# Patient Record
Sex: FEMALE | Race: BLACK OR AFRICAN AMERICAN | NOT HISPANIC OR LATINO | Employment: OTHER | URBAN - METROPOLITAN AREA
[De-identification: names, ages, dates, MRNs, and addresses within clinical notes are randomized per-mention and may not be internally consistent; named-entity substitution may affect disease eponyms.]

---

## 2023-03-31 DIAGNOSIS — F51.01 PRIMARY INSOMNIA: Primary | ICD-10-CM

## 2023-03-31 RX ORDER — TRAZODONE HYDROCHLORIDE 100 MG/1
100 TABLET ORAL NIGHTLY PRN
Qty: 30 TABLET | Refills: 0 | Status: SHIPPED | OUTPATIENT
Start: 2023-03-31 | End: 2023-05-15 | Stop reason: SDUPTHER

## 2023-05-15 DIAGNOSIS — F51.01 PRIMARY INSOMNIA: ICD-10-CM

## 2023-05-15 RX ORDER — TRAZODONE HYDROCHLORIDE 100 MG/1
100 TABLET ORAL NIGHTLY PRN
Qty: 30 TABLET | Refills: 0 | Status: SHIPPED | OUTPATIENT
Start: 2023-05-15 | End: 2023-06-15 | Stop reason: SDUPTHER

## 2023-05-16 NOTE — TELEPHONE ENCOUNTER
Discussed w pt and is not able to dianne statin so reffereing to cardio for eval and tx options for chol as has a pos cardiac ct

## 2023-05-18 DIAGNOSIS — I10 ESSENTIAL HYPERTENSION, BENIGN: Primary | ICD-10-CM

## 2023-05-18 DIAGNOSIS — I48.11 LONGSTANDING PERSISTENT ATRIAL FIBRILLATION (MULTI): ICD-10-CM

## 2023-05-18 RX ORDER — APIXABAN 5 MG/1
TABLET, FILM COATED ORAL
Qty: 180 TABLET | Refills: 0 | Status: SHIPPED | OUTPATIENT
Start: 2023-05-18 | End: 2023-09-29

## 2023-05-18 RX ORDER — AMLODIPINE BESYLATE 5 MG/1
TABLET ORAL
Qty: 90 TABLET | Refills: 0 | Status: SHIPPED | OUTPATIENT
Start: 2023-05-18 | End: 2024-01-02 | Stop reason: ALTCHOICE

## 2023-05-30 ENCOUNTER — OFFICE VISIT (OUTPATIENT)
Dept: PRIMARY CARE | Facility: CLINIC | Age: 63
End: 2023-05-30
Payer: COMMERCIAL

## 2023-05-30 VITALS
SYSTOLIC BLOOD PRESSURE: 115 MMHG | BODY MASS INDEX: 33.8 KG/M2 | DIASTOLIC BLOOD PRESSURE: 66 MMHG | HEIGHT: 64 IN | WEIGHT: 198 LBS | HEART RATE: 72 BPM

## 2023-05-30 DIAGNOSIS — E78.5 DYSLIPIDEMIA: ICD-10-CM

## 2023-05-30 DIAGNOSIS — E03.9 HYPOTHYROIDISM, UNSPECIFIED TYPE: ICD-10-CM

## 2023-05-30 DIAGNOSIS — I15.9 SECONDARY HYPERTENSION: ICD-10-CM

## 2023-05-30 DIAGNOSIS — J44.9 CHRONIC OBSTRUCTIVE PULMONARY DISEASE, UNSPECIFIED COPD TYPE (MULTI): ICD-10-CM

## 2023-05-30 DIAGNOSIS — I21.4 NSTEMI (NON-ST ELEVATED MYOCARDIAL INFARCTION) (MULTI): ICD-10-CM

## 2023-05-30 DIAGNOSIS — E55.9 VITAMIN D DEFICIENCY: ICD-10-CM

## 2023-05-30 DIAGNOSIS — E53.8 B12 DEFICIENCY: ICD-10-CM

## 2023-05-30 DIAGNOSIS — M54.16 LUMBAR RADICULOPATHY: Primary | ICD-10-CM

## 2023-05-30 PROBLEM — M54.50 LOW BACK PAIN: Status: ACTIVE | Noted: 2023-05-30

## 2023-05-30 PROBLEM — M12.9 ARTHROPATHY: Status: ACTIVE | Noted: 2023-05-30

## 2023-05-30 PROBLEM — M19.032 ARTHRITIS OF WRIST, LEFT: Status: ACTIVE | Noted: 2023-05-30

## 2023-05-30 PROBLEM — M25.532 WRIST PAIN, ACUTE, LEFT: Status: ACTIVE | Noted: 2023-05-30

## 2023-05-30 PROBLEM — I26.99 PULMONARY EMBOLUS (MULTI): Status: ACTIVE | Noted: 2020-05-28

## 2023-05-30 PROBLEM — E78.00 PURE HYPERCHOLESTEROLEMIA: Status: ACTIVE | Noted: 2023-05-30

## 2023-05-30 PROBLEM — R09.89 ABSENT PEDAL PULSES: Status: ACTIVE | Noted: 2023-05-30

## 2023-05-30 PROBLEM — R92.8 ABNORMAL MAMMOGRAM: Status: ACTIVE | Noted: 2023-05-30

## 2023-05-30 PROBLEM — R20.2 TINGLING OF BOTH FEET: Status: ACTIVE | Noted: 2023-05-30

## 2023-05-30 PROBLEM — M19.012 DJD OF LEFT SHOULDER: Status: ACTIVE | Noted: 2023-05-30

## 2023-05-30 PROBLEM — N63.20 LEFT BREAST MASS: Status: ACTIVE | Noted: 2023-05-30

## 2023-05-30 PROBLEM — S16.1XXA CERVICAL STRAIN: Status: ACTIVE | Noted: 2023-05-30

## 2023-05-30 PROBLEM — G47.00 INSOMNIA: Status: ACTIVE | Noted: 2023-05-30

## 2023-05-30 PROBLEM — R20.2 PARESTHESIA OF BOTH FEET: Status: ACTIVE | Noted: 2023-05-30

## 2023-05-30 PROBLEM — M25.512 LEFT SHOULDER PAIN: Status: ACTIVE | Noted: 2023-05-30

## 2023-05-30 PROBLEM — F32.A DEPRESSION: Status: ACTIVE | Noted: 2023-05-30

## 2023-05-30 PROBLEM — R51.9 HEADACHE: Status: ACTIVE | Noted: 2023-05-30

## 2023-05-30 PROBLEM — H66.92 OTITIS, LEFT: Status: ACTIVE | Noted: 2023-05-30

## 2023-05-30 PROBLEM — F10.10 ALCOHOL ABUSE: Status: ACTIVE | Noted: 2023-05-30

## 2023-05-30 PROBLEM — I82.409 DEEP VEIN THROMBOSIS (DVT) (MULTI): Status: ACTIVE | Noted: 2023-05-30
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
## 2023-05-30 PROBLEM — E05.00 GRAVES DISEASE: Status: ACTIVE | Noted: 2022-06-03

## 2023-05-30 PROBLEM — R06.02 SHORTNESS OF BREATH: Status: ACTIVE | Noted: 2023-05-30

## 2023-05-30 PROBLEM — G62.9 PERIPHERAL NEUROPATHY: Status: ACTIVE | Noted: 2023-05-30

## 2023-05-30 PROBLEM — F99 CHRONIC MENTAL ILLNESS: Status: ACTIVE | Noted: 2023-05-30

## 2023-05-30 PROBLEM — C56.9 OVARIAN CANCER (MULTI): Status: ACTIVE | Noted: 2023-05-30

## 2023-05-30 PROBLEM — K80.20 GALLSTONES: Status: ACTIVE | Noted: 2023-05-30

## 2023-05-30 PROBLEM — I10 HTN (HYPERTENSION): Status: ACTIVE | Noted: 2022-06-03

## 2023-05-30 PROBLEM — R53.83 FATIGUE: Status: ACTIVE | Noted: 2023-05-30

## 2023-05-30 PROBLEM — R55 VASOVAGAL SYNCOPE: Status: ACTIVE | Noted: 2023-05-30

## 2023-05-30 PROBLEM — R10.13 ABDOMINAL PAIN, EPIGASTRIC: Status: ACTIVE | Noted: 2023-05-30

## 2023-05-30 LAB
ALANINE AMINOTRANSFERASE (SGPT) (U/L) IN SER/PLAS: 12 U/L (ref 7–45)
ALBUMIN (G/DL) IN SER/PLAS: 4 G/DL (ref 3.4–5)
ALKALINE PHOSPHATASE (U/L) IN SER/PLAS: 75 U/L (ref 33–136)
ANION GAP IN SER/PLAS: 8 MMOL/L (ref 10–20)
ASPARTATE AMINOTRANSFERASE (SGOT) (U/L) IN SER/PLAS: 19 U/L (ref 9–39)
BASOPHILS (10*3/UL) IN BLOOD BY AUTOMATED COUNT: 0.05 X10E9/L (ref 0–0.1)
BASOPHILS/100 LEUKOCYTES IN BLOOD BY AUTOMATED COUNT: 0.6 % (ref 0–2)
BILIRUBIN TOTAL (MG/DL) IN SER/PLAS: 0.3 MG/DL (ref 0–1.2)
CALCIDIOL (25 OH VITAMIN D3) (NG/ML) IN SER/PLAS: 44 NG/ML
CALCIUM (MG/DL) IN SER/PLAS: 9.9 MG/DL (ref 8.6–10.6)
CARBON DIOXIDE, TOTAL (MMOL/L) IN SER/PLAS: 27 MMOL/L (ref 21–32)
CHLORIDE (MMOL/L) IN SER/PLAS: 108 MMOL/L (ref 98–107)
CHOLESTEROL (MG/DL) IN SER/PLAS: 249 MG/DL (ref 0–199)
CHOLESTEROL IN HDL (MG/DL) IN SER/PLAS: 62.4 MG/DL
CHOLESTEROL/HDL RATIO: 4
COBALAMIN (VITAMIN B12) (PG/ML) IN SER/PLAS: 401 PG/ML (ref 211–911)
CREATININE (MG/DL) IN SER/PLAS: 0.71 MG/DL (ref 0.5–1.05)
EOSINOPHILS (10*3/UL) IN BLOOD BY AUTOMATED COUNT: 0.22 X10E9/L (ref 0–0.7)
EOSINOPHILS/100 LEUKOCYTES IN BLOOD BY AUTOMATED COUNT: 2.7 % (ref 0–6)
ERYTHROCYTE DISTRIBUTION WIDTH (RATIO) BY AUTOMATED COUNT: 15.2 % (ref 11.5–14.5)
ERYTHROCYTE MEAN CORPUSCULAR HEMOGLOBIN CONCENTRATION (G/DL) BY AUTOMATED: 31.6 G/DL (ref 32–36)
ERYTHROCYTE MEAN CORPUSCULAR VOLUME (FL) BY AUTOMATED COUNT: 99 FL (ref 80–100)
ERYTHROCYTES (10*6/UL) IN BLOOD BY AUTOMATED COUNT: 4.14 X10E12/L (ref 4–5.2)
GFR FEMALE: >90 ML/MIN/1.73M2
GLUCOSE (MG/DL) IN SER/PLAS: 108 MG/DL (ref 74–99)
HEMATOCRIT (%) IN BLOOD BY AUTOMATED COUNT: 41.1 % (ref 36–46)
HEMOGLOBIN (G/DL) IN BLOOD: 13 G/DL (ref 12–16)
IMMATURE GRANULOCYTES/100 LEUKOCYTES IN BLOOD BY AUTOMATED COUNT: 0.2 % (ref 0–0.9)
LDL: 175 MG/DL (ref 0–99)
LEUKOCYTES (10*3/UL) IN BLOOD BY AUTOMATED COUNT: 8.3 X10E9/L (ref 4.4–11.3)
LYMPHOCYTES (10*3/UL) IN BLOOD BY AUTOMATED COUNT: 2.81 X10E9/L (ref 1.2–4.8)
LYMPHOCYTES/100 LEUKOCYTES IN BLOOD BY AUTOMATED COUNT: 34 % (ref 13–44)
MONOCYTES (10*3/UL) IN BLOOD BY AUTOMATED COUNT: 0.69 X10E9/L (ref 0.1–1)
MONOCYTES/100 LEUKOCYTES IN BLOOD BY AUTOMATED COUNT: 8.3 % (ref 2–10)
NEUTROPHILS (10*3/UL) IN BLOOD BY AUTOMATED COUNT: 4.48 X10E9/L (ref 1.2–7.7)
NEUTROPHILS/100 LEUKOCYTES IN BLOOD BY AUTOMATED COUNT: 54.2 % (ref 40–80)
NRBC (PER 100 WBCS) BY AUTOMATED COUNT: 0 /100 WBC (ref 0–0)
PLATELETS (10*3/UL) IN BLOOD AUTOMATED COUNT: 325 X10E9/L (ref 150–450)
POTASSIUM (MMOL/L) IN SER/PLAS: 4 MMOL/L (ref 3.5–5.3)
PROTEIN TOTAL: 7 G/DL (ref 6.4–8.2)
SODIUM (MMOL/L) IN SER/PLAS: 139 MMOL/L (ref 136–145)
THYROTROPIN (MIU/L) IN SER/PLAS BY DETECTION LIMIT <= 0.05 MIU/L: 0.81 MIU/L (ref 0.44–3.98)
TRIGLYCERIDE (MG/DL) IN SER/PLAS: 57 MG/DL (ref 0–149)
UREA NITROGEN (MG/DL) IN SER/PLAS: 11 MG/DL (ref 6–23)
VLDL: 11 MG/DL (ref 0–40)

## 2023-05-30 PROCEDURE — 82607 VITAMIN B-12: CPT

## 2023-05-30 PROCEDURE — 80053 COMPREHEN METABOLIC PANEL: CPT

## 2023-05-30 PROCEDURE — 3078F DIAST BP <80 MM HG: CPT | Performed by: FAMILY MEDICINE

## 2023-05-30 PROCEDURE — 3074F SYST BP LT 130 MM HG: CPT | Performed by: FAMILY MEDICINE

## 2023-05-30 PROCEDURE — 84443 ASSAY THYROID STIM HORMONE: CPT

## 2023-05-30 PROCEDURE — 85025 COMPLETE CBC W/AUTO DIFF WBC: CPT

## 2023-05-30 PROCEDURE — 82306 VITAMIN D 25 HYDROXY: CPT

## 2023-05-30 PROCEDURE — 80061 LIPID PANEL: CPT

## 2023-05-30 PROCEDURE — 99214 OFFICE O/P EST MOD 30 MIN: CPT | Performed by: FAMILY MEDICINE

## 2023-05-30 ASSESSMENT — ENCOUNTER SYMPTOMS
MUSCULOSKELETAL NEGATIVE: 1
CONSTITUTIONAL NEGATIVE: 1
GASTROINTESTINAL NEGATIVE: 1
CARDIOVASCULAR NEGATIVE: 1
RESPIRATORY NEGATIVE: 1
NEUROLOGICAL NEGATIVE: 1

## 2023-05-30 NOTE — PROGRESS NOTES
"Subjective   Patient ID: Celeste Dave is a 62 y.o. female who presents for Annual Exam.    HPI muscle cramps arms and legs, chol, copd, , lumbar disc dis, due for colon    Review of Systems   Constitutional: Negative.    HENT: Negative.     Respiratory: Negative.     Cardiovascular: Negative.    Gastrointestinal: Negative.    Musculoskeletal: Negative.    Neurological: Negative.        Objective   /66   Pulse 72   Ht 1.626 m (5' 4\")   Wt 89.8 kg (198 lb)   BMI 33.99 kg/m²     Physical Exam  Vitals reviewed.   Constitutional:       Appearance: Normal appearance. She is normal weight.   Eyes:      Extraocular Movements: Extraocular movements intact.      Conjunctiva/sclera: Conjunctivae normal.      Pupils: Pupils are equal, round, and reactive to light.   Cardiovascular:      Rate and Rhythm: Normal rate and regular rhythm.      Pulses: Normal pulses.      Heart sounds: Normal heart sounds.   Pulmonary:      Effort: Pulmonary effort is normal.      Breath sounds: Normal breath sounds.   Abdominal:      General: Bowel sounds are normal.      Palpations: Abdomen is soft.   Musculoskeletal:         General: Normal range of motion.   Skin:     General: Skin is warm and dry.   Neurological:      General: No focal deficit present.      Mental Status: She is alert and oriented to person, place, and time. Mental status is at baseline.         Assessment/Plan   Problem List Items Addressed This Visit          Nervous    Lumbar radiculopathy - Primary       Respiratory    COPD (chronic obstructive pulmonary disease) (CMS/HCC)    Relevant Orders    Comprehensive Metabolic Panel       Circulatory    HTN (hypertension)    NSTEMI (non-ST elevated myocardial infarction) (CMS/[facility])       Endocrine/Metabolic    Hypothyroidism    Relevant Orders    TSH with reflex to Free T4 if abnormal    Vitamin D deficiency    Relevant Orders    Vitamin D, Total       Other    Dyslipidemia    Relevant Orders    Lipid Panel     Other " Visit Diagnoses       B12 deficiency        Relevant Orders    CBC and Auto Differential    Vitamin B12

## 2023-06-01 ENCOUNTER — TELEPHONE (OUTPATIENT)
Dept: PRIMARY CARE | Facility: CLINIC | Age: 63
End: 2023-06-01
Payer: COMMERCIAL

## 2023-06-15 DIAGNOSIS — F51.01 PRIMARY INSOMNIA: ICD-10-CM

## 2023-06-16 RX ORDER — TRAZODONE HYDROCHLORIDE 100 MG/1
100 TABLET ORAL NIGHTLY PRN
Qty: 30 TABLET | Refills: 3 | Status: SHIPPED | OUTPATIENT
Start: 2023-06-16 | End: 2023-10-23 | Stop reason: SDUPTHER

## 2023-08-21 DIAGNOSIS — E05.00 GRAVES DISEASE: Primary | ICD-10-CM

## 2023-08-21 RX ORDER — LEVOTHYROXINE SODIUM 112 UG/1
TABLET ORAL
COMMUNITY
End: 2023-08-21 | Stop reason: SDUPTHER

## 2023-08-21 RX ORDER — LEVOTHYROXINE SODIUM 112 UG/1
112 TABLET ORAL DAILY
Qty: 90 TABLET | Refills: 0 | Status: SHIPPED | OUTPATIENT
Start: 2023-08-21 | End: 2023-08-29 | Stop reason: SDUPTHER

## 2023-08-29 ENCOUNTER — OFFICE VISIT (OUTPATIENT)
Dept: PRIMARY CARE | Facility: CLINIC | Age: 63
End: 2023-08-29
Payer: COMMERCIAL

## 2023-08-29 VITALS
DIASTOLIC BLOOD PRESSURE: 83 MMHG | HEIGHT: 64 IN | HEART RATE: 56 BPM | WEIGHT: 199 LBS | SYSTOLIC BLOOD PRESSURE: 130 MMHG | BODY MASS INDEX: 33.97 KG/M2

## 2023-08-29 DIAGNOSIS — M65.30 TRIGGER FINGER, UNSPECIFIED FINGER, UNSPECIFIED LATERALITY: ICD-10-CM

## 2023-08-29 DIAGNOSIS — E78.5 DYSLIPIDEMIA: Primary | ICD-10-CM

## 2023-08-29 DIAGNOSIS — M79.641 NON-ARTICULAR PAIN OF RIGHT HAND: ICD-10-CM

## 2023-08-29 DIAGNOSIS — S39.012A LUMBAR STRAIN, INITIAL ENCOUNTER: ICD-10-CM

## 2023-08-29 DIAGNOSIS — E05.00 GRAVES DISEASE: ICD-10-CM

## 2023-08-29 DIAGNOSIS — Z23 NEED FOR INFLUENZA VACCINATION: ICD-10-CM

## 2023-08-29 DIAGNOSIS — G56.00 CARPAL TUNNEL SYNDROME, UNSPECIFIED LATERALITY: ICD-10-CM

## 2023-08-29 PROBLEM — R94.31 ST SEGMENT DEPRESSION: Status: ACTIVE | Noted: 2020-05-28

## 2023-08-29 PROBLEM — E66.09 CLASS 2 OBESITY DUE TO EXCESS CALORIES IN ADULT: Status: ACTIVE | Noted: 2022-09-21

## 2023-08-29 PROBLEM — S50.02XA CONTUSION OF LEFT ELBOW: Status: ACTIVE | Noted: 2023-08-29

## 2023-08-29 PROBLEM — F32.4 MAJOR DEPRESSIVE DISORDER WITH SINGLE EPISODE, IN PARTIAL REMISSION (CMS-HCC): Status: ACTIVE | Noted: 2022-09-21

## 2023-08-29 PROBLEM — Z86.711 PERSONAL HISTORY OF PULMONARY EMBOLISM: Status: ACTIVE | Noted: 2020-05-28

## 2023-08-29 PROBLEM — E66.812 CLASS 2 OBESITY DUE TO EXCESS CALORIES IN ADULT: Status: ACTIVE | Noted: 2022-09-21

## 2023-08-29 PROBLEM — I25.2 HISTORY OF NON-ST ELEVATION MYOCARDIAL INFARCTION (NSTEMI): Status: ACTIVE | Noted: 2020-06-02

## 2023-08-29 PROBLEM — E44.0 MODERATE PROTEIN-CALORIE MALNUTRITION (MULTI): Status: ACTIVE | Noted: 2020-05-28

## 2023-08-29 PROCEDURE — 90686 IIV4 VACC NO PRSV 0.5 ML IM: CPT | Performed by: FAMILY MEDICINE

## 2023-08-29 PROCEDURE — 3075F SYST BP GE 130 - 139MM HG: CPT | Performed by: FAMILY MEDICINE

## 2023-08-29 PROCEDURE — 99214 OFFICE O/P EST MOD 30 MIN: CPT | Performed by: FAMILY MEDICINE

## 2023-08-29 PROCEDURE — 3079F DIAST BP 80-89 MM HG: CPT | Performed by: FAMILY MEDICINE

## 2023-08-29 PROCEDURE — 90471 IMMUNIZATION ADMIN: CPT | Performed by: FAMILY MEDICINE

## 2023-08-29 RX ORDER — LEVOTHYROXINE SODIUM 112 UG/1
112 TABLET ORAL DAILY
Qty: 90 TABLET | Refills: 1 | Status: SHIPPED | OUTPATIENT
Start: 2023-08-29 | End: 2023-11-20 | Stop reason: SDUPTHER

## 2023-08-29 RX ORDER — EZETIMIBE 10 MG/1
10 TABLET ORAL DAILY
Qty: 90 TABLET | Refills: 1 | Status: SHIPPED | OUTPATIENT
Start: 2023-08-29 | End: 2024-02-23 | Stop reason: SDUPTHER

## 2023-08-29 ASSESSMENT — ENCOUNTER SYMPTOMS
NEUROLOGICAL NEGATIVE: 1
CARDIOVASCULAR NEGATIVE: 1
GASTROINTESTINAL NEGATIVE: 1
CONSTITUTIONAL NEGATIVE: 1
RESPIRATORY NEGATIVE: 1
MUSCULOSKELETAL NEGATIVE: 1

## 2023-08-29 NOTE — PROGRESS NOTES
Pt comes in for pain in both of her hands. Pt states they lock up off and on. She made the appt bc they locked up and she couldn't move them.

## 2023-08-29 NOTE — PROGRESS NOTES
"Subjective   Patient ID: Celeste Dave is a 62 y.o. female who presents for No chief complaint on file..    HPI bilat hand pain , lumbar strain , chol unable to dianne statins    Review of Systems   Constitutional: Negative.    HENT: Negative.     Respiratory: Negative.     Cardiovascular: Negative.    Gastrointestinal: Negative.    Musculoskeletal: Negative.         Spasms in hand bilat   R lumbar pain w sciatica   Neurological: Negative.        Objective   /83   Pulse 56   Ht 1.626 m (5' 4\")   Wt 90.3 kg (199 lb)   BMI 34.16 kg/m²     Physical Exam  Vitals reviewed.   Constitutional:       Appearance: Normal appearance. She is normal weight.   Eyes:      Extraocular Movements: Extraocular movements intact.      Conjunctiva/sclera: Conjunctivae normal.      Pupils: Pupils are equal, round, and reactive to light.   Cardiovascular:      Rate and Rhythm: Normal rate and regular rhythm.      Pulses: Normal pulses.      Heart sounds: Normal heart sounds.   Pulmonary:      Effort: Pulmonary effort is normal.      Breath sounds: Normal breath sounds.   Abdominal:      General: Bowel sounds are normal.      Palpations: Abdomen is soft.   Musculoskeletal:         General: Normal range of motion.      Comments: Lumbar spasm r side     Skin:     General: Skin is warm and dry.   Neurological:      General: No focal deficit present.      Mental Status: She is alert and oriented to person, place, and time. Mental status is at baseline.         Assessment/Plan   Problem List Items Addressed This Visit       Carpal tunnel syndrome    Relevant Orders    Referral to Orthopaedic Surgery    Dyslipidemia - Primary    Relevant Medications    ezetimibe (Zetia) 10 mg tablet    Graves disease    Relevant Medications    levothyroxine (Synthroid) 112 mcg tablet    Lumbar strain, initial encounter    Non-articular pain of right hand    Relevant Orders    Referral to Orthopaedic Surgery    Trigger finger    Relevant Orders    Referral " to Orthopaedic Surgery

## 2023-10-04 ENCOUNTER — ANCILLARY PROCEDURE (OUTPATIENT)
Dept: RADIOLOGY | Facility: CLINIC | Age: 63
End: 2023-10-04
Payer: COMMERCIAL

## 2023-10-04 DIAGNOSIS — M79.641 BILATERAL HAND PAIN: ICD-10-CM

## 2023-10-04 DIAGNOSIS — M79.642 BILATERAL HAND PAIN: ICD-10-CM

## 2023-10-04 PROCEDURE — 73130 X-RAY EXAM OF HAND: CPT | Mod: 50,FY

## 2023-10-04 PROCEDURE — 73130 X-RAY EXAM OF HAND: CPT | Mod: BILATERAL PROCEDURE | Performed by: RADIOLOGY

## 2023-10-05 ENCOUNTER — OFFICE VISIT (OUTPATIENT)
Dept: ORTHOPEDIC SURGERY | Facility: CLINIC | Age: 63
End: 2023-10-05
Payer: COMMERCIAL

## 2023-10-05 DIAGNOSIS — M79.641 NON-ARTICULAR PAIN OF RIGHT HAND: ICD-10-CM

## 2023-10-05 DIAGNOSIS — G56.00 CARPAL TUNNEL SYNDROME, UNSPECIFIED LATERALITY: ICD-10-CM

## 2023-10-05 DIAGNOSIS — G24.8 FOCAL DYSTONIA: Primary | ICD-10-CM

## 2023-10-05 DIAGNOSIS — M79.641 BILATERAL HAND PAIN: ICD-10-CM

## 2023-10-05 DIAGNOSIS — M79.642 BILATERAL HAND PAIN: ICD-10-CM

## 2023-10-05 DIAGNOSIS — M65.30 TRIGGER FINGER, UNSPECIFIED FINGER, UNSPECIFIED LATERALITY: ICD-10-CM

## 2023-10-05 PROCEDURE — 99204 OFFICE O/P NEW MOD 45 MIN: CPT | Performed by: ORTHOPAEDIC SURGERY

## 2023-10-05 NOTE — PROGRESS NOTES
"Orthopaedic Surgery  New Patient Clinic Note    Celeste Dave [id number] 2023    Reason for Consult: R>L radial sided hand \"locking up\"    HPI: 62-year-old female PMH unprovoked PE (on lifelong Eliquis), Graves' disease (status post radioactive iodine, now on levothyroxine) presents with ~3 years of periodic unprovoked muscle spasms most commonly involving the R thumb and index finger with occasional spread to other digits.  This has been happening more frequently over the past 2 to 3 months but does not happen every day.  When she does have the muscle spasms, it can last up to 20 minutes, is 10 out of 10 in pain intensity, and resolves only with time rather than any particular intervention.  She does occasionally have symptoms in her left thumb and index finger but this is less common.  She has tried electrolyte drinks, lifting salt, tonic water, which have not helped.  No significant sensory changes.  This occurs during the day and does not wake her from sleep.  She has not noticed any particular instigating postures or activities.  Presents after referral from her primary care doctor    ROS: A 30-item multi-system Review Of Systems was obtained on today's intake form.  This was reviewed with the patient and is correct.  The pertinent positives and negatives are listed above.  The form has been scanned separately into the medical record.    PMH:   Past Medical History:   Diagnosis Date    Candidiasis, unspecified 2015    Yeast infection    Other abnormal and inconclusive findings on diagnostic imaging of breast     Abnormal finding on breast imaging    Personal history of other diseases of the nervous system and sense organs 2015    History of episcleritis    Personal history of pulmonary embolism     History of pulmonary embolism        PSH:    Past Surgical History:   Procedure Laterality Date    BREAST BIOPSY  2018    Biopsy Breast Percutaneous Needle Core     SECTION, " CLASSIC  2018     Section    CHOLECYSTECTOMY  2018    Cholecystectomy Laparoscopic    CT ANGIO HEART CORONARY  2018    CT HEART CORONARY ANGIOGRAM 2018 [facility] CLINICAL LEGACY    EXPLORATORY LAPAROTOMY  2018    Exploratory Laparotomy    OTHER SURGICAL HISTORY  2018    Uterine Myomectomy    TOTAL ABDOMINAL HYSTERECTOMY W/ BILATERAL SALPINGOOPHORECTOMY  2018    Total Abdominal Hysterectomy With Removal Of Both Ovaries        SHx: She smokes a pack a day.    Meds:   Current Outpatient Medications on File Prior to Visit   Medication Sig Dispense Refill    amLODIPine (Norvasc) 5 mg tablet TAKE ONE TABLET BY MOUTH DAILY 90 tablet 0    Eliquis 5 mg tablet TAKE ONE TABLET BY MOUTH TWO TIMES A  tablet 0    ezetimibe (Zetia) 10 mg tablet Take 1 tablet (10 mg) by mouth once daily. 90 tablet 1    levothyroxine (Synthroid) 112 mcg tablet Take 1 tablet (112 mcg) by mouth once daily. 90 tablet 1    traZODone (Desyrel) 100 mg tablet Take 1 tablet (100 mg) by mouth as needed at bedtime for sleep. 30 tablet 3    [DISCONTINUED] Eliquis 5 mg tablet TAKE ONE TABLET BY MOUTH TWO TIMES A  tablet 0     No current facility-administered medications on file prior to visit.       PHYSICAL EXAM    GEN: AaOx4, NAD  HEENT: normocephalic atraumatic, EOMI, MMM, pupils equal and round  PSYCH: appropriate mood and affect  RESP: nonlabored breathing on room air  CARDIAC: Extremities WWP, RRR to peripheral palpation  NEURO: CN 2-12 grossly intact  SKIN: no rashes    Skin of both hands and wrists is intact with no erythema, ecchymosis, or diffuse swelling.  Normal skin drag and coloration.  Full composite finger flexion extension bilaterally with full intrinsic plus minus posture.  Good sagittal plane balance.  5/5 APB and hand intrinsics bilaterally.  Negative Tinel, Phalen, Durkan at both wrists.  Negative Tinel at elbow and negative elbow flexion test.  Cervical range of motion is good and does  not reproduce chief complaint on either side.  I cannot trigger her muscle spasm/posturing today.  Sensation intact to light touch in all distributions.  Capillary refill less than 2 seconds.      Imaging:    XR of the R  hand, obtained and independently interpreted by me today, shows no arthritis, fracture, or dislocation.      Assessment:  62-year-old female PMH unprovoked PE (on lifelong Eliquis), Graves' disease (status post radioactive iodine, now on levothyroxine) presents with ~3 years of periodic unprovoked muscle spasms most commonly involving the R thumb and index finger most consistent with focal dystonia.  Discussed that her presentation is classic for focal dystonia, or focal dystonia is not an orthopedic problem rather is a neurology problem that to our knowledge does not have a reliable therapeutic option at this point.  Patient expressed understanding of this, and will schedule appointment with neurology if her symptoms worsen or spread to other locations of her body outside of her bilateral thumb and index finger.  Patient was in complete agreement this plan and fully understood.    Plan:  -Follow-up on an as-needed basis if develops a hand or upper extremity complaint  -Recommend follow-up with Neurology if having worsened or more bothersome symptoms, or if symptoms spread to new areas.    Carlos Knott MD  PGY-3 Orthopedic Surgery  [facility]  Pager: [phone]  Available by Epic Message     ATTESTATION:  I saw and evaluated the patient. I personally obtained the key and critical portions of the history and physical exam or was physically present for key and critical portions performed by the resident/fellow. I reviewed the resident/fellow's documentation and discussed the patient with the resident/fellow. I agree with the resident/fellow's medical decision making as documented in the note.    Jt Maharaj MD

## 2023-10-05 NOTE — LETTER
"October 21, 2023     Donte Hannon MD   [address]  [address]  [facility] [address]    Patient: Celeste Dave   YOB: 1960   Date of Visit: 10/5/2023       Dear Dr. Donte Hannon MD:    Thank you for referring Celeste Dave to me for evaluation. Below are my notes for this consultation.  If you have questions, please do not hesitate to call me. I look forward to following your patient along with you.       Sincerely,     Jt Maharaj MD      CC: No Recipients  ______________________________________________________________________________________    Orthopaedic Surgery  New Patient Clinic Note    Celeste Dave [id number] October 5, 2023    Reason for Consult: R>L radial sided hand \"locking up\"    HPI: 62-year-old female PMH unprovoked PE (on lifelong Eliquis), Graves' disease (status post radioactive iodine, now on levothyroxine) presents with ~3 years of periodic unprovoked muscle spasms most commonly involving the R thumb and index finger with occasional spread to other digits.  This has been happening more frequently over the past 2 to 3 months but does not happen every day.  When she does have the muscle spasms, it can last up to 20 minutes, is 10 out of 10 in pain intensity, and resolves only with time rather than any particular intervention.  She does occasionally have symptoms in her left thumb and index finger but this is less common.  She has tried electrolyte drinks, lifting salt, tonic water, which have not helped.  No significant sensory changes.  This occurs during the day and does not wake her from sleep.  She has not noticed any particular instigating postures or activities.  Presents after referral from her primary care doctor    ROS: A 30-item multi-system Review Of Systems was obtained on today's intake form.  This was reviewed with the patient and is correct.  The pertinent positives and negatives are listed above.  The form has been scanned separately into the medical " record.    PMH:   Past Medical History:   Diagnosis Date   • Candidiasis, unspecified 2015    Yeast infection   • Other abnormal and inconclusive findings on diagnostic imaging of breast     Abnormal finding on breast imaging   • Personal history of other diseases of the nervous system and sense organs 2015    History of episcleritis   • Personal history of pulmonary embolism     History of pulmonary embolism        PSH:    Past Surgical History:   Procedure Laterality Date   • BREAST BIOPSY  2018    Biopsy Breast Percutaneous Needle Core   •  SECTION, CLASSIC  2018     Section   • CHOLECYSTECTOMY  2018    Cholecystectomy Laparoscopic   • CT ANGIO HEART CORONARY  2018    CT HEART CORONARY ANGIOGRAM 2018 [facility] CLINICAL LEGACY   • EXPLORATORY LAPAROTOMY  2018    Exploratory Laparotomy   • OTHER SURGICAL HISTORY  2018    Uterine Myomectomy   • TOTAL ABDOMINAL HYSTERECTOMY W/ BILATERAL SALPINGOOPHORECTOMY  2018    Total Abdominal Hysterectomy With Removal Of Both Ovaries        SHx: She smokes a pack a day.    Meds:   Current Outpatient Medications on File Prior to Visit   Medication Sig Dispense Refill   • amLODIPine (Norvasc) 5 mg tablet TAKE ONE TABLET BY MOUTH DAILY 90 tablet 0   • Eliquis 5 mg tablet TAKE ONE TABLET BY MOUTH TWO TIMES A  tablet 0   • ezetimibe (Zetia) 10 mg tablet Take 1 tablet (10 mg) by mouth once daily. 90 tablet 1   • levothyroxine (Synthroid) 112 mcg tablet Take 1 tablet (112 mcg) by mouth once daily. 90 tablet 1   • traZODone (Desyrel) 100 mg tablet Take 1 tablet (100 mg) by mouth as needed at bedtime for sleep. 30 tablet 3   • [DISCONTINUED] Eliquis 5 mg tablet TAKE ONE TABLET BY MOUTH TWO TIMES A  tablet 0     No current facility-administered medications on file prior to visit.       PHYSICAL EXAM    GEN: AaOx4, NAD  HEENT: normocephalic atraumatic, EOMI, MMM, pupils equal and round  PSYCH: appropriate mood  and affect  RESP: nonlabored breathing on room air  CARDIAC: Extremities WWP, RRR to peripheral palpation  NEURO: CN 2-12 grossly intact  SKIN: no rashes    Skin of both hands and wrists is intact with no erythema, ecchymosis, or diffuse swelling.  Normal skin drag and coloration.  Full composite finger flexion extension bilaterally with full intrinsic plus minus posture.  Good sagittal plane balance.  5/5 APB and hand intrinsics bilaterally.  Negative Tinel, Phalen, Durkan at both wrists.  Negative Tinel at elbow and negative elbow flexion test.  Cervical range of motion is good and does not reproduce chief complaint on either side.  I cannot trigger her muscle spasm/posturing today.  Sensation intact to light touch in all distributions.  Capillary refill less than 2 seconds.      Imaging:    XR of the R  hand, obtained and independently interpreted by me today, shows no arthritis, fracture, or dislocation.      Assessment:  62-year-old female PMH unprovoked PE (on lifelong Eliquis), Graves' disease (status post radioactive iodine, now on levothyroxine) presents with ~3 years of periodic unprovoked muscle spasms most commonly involving the R thumb and index finger most consistent with focal dystonia.  Discussed that her presentation is classic for focal dystonia, or focal dystonia is not an orthopedic problem rather is a neurology problem that to our knowledge does not have a reliable therapeutic option at this point.  Patient expressed understanding of this, and will schedule appointment with neurology if her symptoms worsen or spread to other locations of her body outside of her bilateral thumb and index finger.  Patient was in complete agreement this plan and fully understood.    Plan:  -Follow-up on an as-needed basis if develops a hand or upper extremity complaint  -Recommend follow-up with Neurology if having worsened or more bothersome symptoms, or if symptoms spread to new areas.    Carlos Knott MD  PGY-3  Orthopedic Surgery  [facility]  Pager: [phone]  Available by Epic Message     ATTESTATION:  I saw and evaluated the patient. I personally obtained the key and critical portions of the history and physical exam or was physically present for key and critical portions performed by the resident/fellow. I reviewed the resident/fellow's documentation and discussed the patient with the resident/fellow. I agree with the resident/fellow's medical decision making as documented in the note.    tJ Maharaj MD

## 2023-10-23 DIAGNOSIS — F51.01 PRIMARY INSOMNIA: ICD-10-CM

## 2023-10-24 RX ORDER — TRAZODONE HYDROCHLORIDE 100 MG/1
100 TABLET ORAL NIGHTLY PRN
Qty: 30 TABLET | Refills: 3 | Status: SHIPPED | OUTPATIENT
Start: 2023-10-24 | End: 2024-02-23 | Stop reason: SDUPTHER

## 2023-11-20 DIAGNOSIS — E05.00 GRAVES DISEASE: ICD-10-CM

## 2023-11-20 RX ORDER — LEVOTHYROXINE SODIUM 112 UG/1
112 TABLET ORAL DAILY
Qty: 90 TABLET | Refills: 1 | Status: SHIPPED | OUTPATIENT
Start: 2023-11-20 | End: 2024-05-20 | Stop reason: SDUPTHER

## 2023-12-27 DIAGNOSIS — I48.11 LONGSTANDING PERSISTENT ATRIAL FIBRILLATION (MULTI): ICD-10-CM

## 2024-01-02 ENCOUNTER — OFFICE VISIT (OUTPATIENT)
Dept: PRIMARY CARE | Facility: CLINIC | Age: 64
End: 2024-01-02
Payer: COMMERCIAL

## 2024-01-02 VITALS
SYSTOLIC BLOOD PRESSURE: 125 MMHG | HEART RATE: 62 BPM | WEIGHT: 205 LBS | BODY MASS INDEX: 35 KG/M2 | DIASTOLIC BLOOD PRESSURE: 78 MMHG | HEIGHT: 64 IN

## 2024-01-02 DIAGNOSIS — E66.01 CLASS 2 SEVERE OBESITY DUE TO EXCESS CALORIES WITH SERIOUS COMORBIDITY AND BODY MASS INDEX (BMI) OF 35.0 TO 35.9 IN ADULT (MULTI): ICD-10-CM

## 2024-01-02 DIAGNOSIS — I21.4 NSTEMI (NON-ST ELEVATED MYOCARDIAL INFARCTION) (MULTI): Primary | ICD-10-CM

## 2024-01-02 DIAGNOSIS — I15.9 SECONDARY HYPERTENSION: ICD-10-CM

## 2024-01-02 DIAGNOSIS — Z12.31 SCREENING MAMMOGRAM, ENCOUNTER FOR: ICD-10-CM

## 2024-01-02 DIAGNOSIS — E03.9 HYPOTHYROIDISM, UNSPECIFIED TYPE: ICD-10-CM

## 2024-01-02 DIAGNOSIS — F17.211 CIGARETTE NICOTINE DEPENDENCE IN REMISSION: ICD-10-CM

## 2024-01-02 DIAGNOSIS — I82.5Y9 CHRONIC DEEP VEIN THROMBOSIS (DVT) OF PROXIMAL VEIN OF LOWER EXTREMITY, UNSPECIFIED LATERALITY (MULTI): ICD-10-CM

## 2024-01-02 DIAGNOSIS — F17.210 CIGARETTE SMOKER: ICD-10-CM

## 2024-01-02 DIAGNOSIS — G24.8 FOCAL DYSTONIA: ICD-10-CM

## 2024-01-02 DIAGNOSIS — K63.9 COLON ABNORMALITY: ICD-10-CM

## 2024-01-02 DIAGNOSIS — E78.2 MIXED HYPERLIPIDEMIA: ICD-10-CM

## 2024-01-02 PROBLEM — R20.2 TINGLING OF SKIN: Status: ACTIVE | Noted: 2023-05-30

## 2024-01-02 PROBLEM — M25.559 ARTHRALGIA OF HIP: Status: ACTIVE | Noted: 2024-01-02

## 2024-01-02 LAB
ALBUMIN SERPL BCP-MCNC: 4.1 G/DL (ref 3.4–5)
ALP SERPL-CCNC: 67 U/L (ref 33–136)
ALT SERPL W P-5'-P-CCNC: 15 U/L (ref 7–45)
ANION GAP SERPL CALC-SCNC: 12 MMOL/L (ref 10–20)
AST SERPL W P-5'-P-CCNC: 22 U/L (ref 9–39)
BILIRUB SERPL-MCNC: 0.3 MG/DL (ref 0–1.2)
BUN SERPL-MCNC: 10 MG/DL (ref 6–23)
CALCIUM SERPL-MCNC: 9.7 MG/DL (ref 8.6–10.6)
CHLORIDE SERPL-SCNC: 105 MMOL/L (ref 98–107)
CHOLEST SERPL-MCNC: 217 MG/DL (ref 0–199)
CHOLESTEROL/HDL RATIO: 3
CO2 SERPL-SCNC: 28 MMOL/L (ref 21–32)
CREAT SERPL-MCNC: 0.72 MG/DL (ref 0.5–1.05)
GFR SERPL CREATININE-BSD FRML MDRD: >90 ML/MIN/1.73M*2
GLUCOSE SERPL-MCNC: 84 MG/DL (ref 74–99)
HDLC SERPL-MCNC: 72.5 MG/DL
LDLC SERPL CALC-MCNC: 132 MG/DL
NON HDL CHOLESTEROL: 145 MG/DL (ref 0–149)
POTASSIUM SERPL-SCNC: 4.3 MMOL/L (ref 3.5–5.3)
PROT SERPL-MCNC: 6.8 G/DL (ref 6.4–8.2)
SODIUM SERPL-SCNC: 141 MMOL/L (ref 136–145)
TRIGL SERPL-MCNC: 64 MG/DL (ref 0–149)
TSH SERPL-ACNC: 3.73 MIU/L (ref 0.44–3.98)
VLDL: 13 MG/DL (ref 0–40)

## 2024-01-02 PROCEDURE — 84443 ASSAY THYROID STIM HORMONE: CPT

## 2024-01-02 PROCEDURE — 3008F BODY MASS INDEX DOCD: CPT | Performed by: FAMILY MEDICINE

## 2024-01-02 PROCEDURE — 80061 LIPID PANEL: CPT

## 2024-01-02 PROCEDURE — 3078F DIAST BP <80 MM HG: CPT | Performed by: FAMILY MEDICINE

## 2024-01-02 PROCEDURE — 36415 COLL VENOUS BLD VENIPUNCTURE: CPT

## 2024-01-02 PROCEDURE — 99215 OFFICE O/P EST HI 40 MIN: CPT | Performed by: FAMILY MEDICINE

## 2024-01-02 PROCEDURE — 80053 COMPREHEN METABOLIC PANEL: CPT

## 2024-01-02 PROCEDURE — 3074F SYST BP LT 130 MM HG: CPT | Performed by: FAMILY MEDICINE

## 2024-01-02 RX ORDER — AMLODIPINE BESYLATE 2.5 MG/1
2.5 TABLET ORAL DAILY
Qty: 90 TABLET | Refills: 1 | Status: SHIPPED | OUTPATIENT
Start: 2024-01-02 | End: 2024-06-30

## 2024-01-02 ASSESSMENT — ENCOUNTER SYMPTOMS
RESPIRATORY NEGATIVE: 1
MUSCULOSKELETAL NEGATIVE: 1
OCCASIONAL FEELINGS OF UNSTEADINESS: 0
NEUROLOGICAL NEGATIVE: 1
LOSS OF SENSATION IN FEET: 0
CARDIOVASCULAR NEGATIVE: 1
DEPRESSION: 0
CONSTITUTIONAL NEGATIVE: 1
GASTROINTESTINAL NEGATIVE: 1

## 2024-01-02 NOTE — PATIENT INSTRUCTIONS
Lung CT screen - [phone]   Mammogram - or [phone] to schedule   Cardiology-- Dr Torres -   [phone]

## 2024-01-02 NOTE — PROGRESS NOTES
"Subjective   Patient ID: Celeste Dave is a 63 y.o. female who presents for No chief complaint on file..    HPI pt w htn, chol, hx of decreased ef, dvt, hyhpothyroid, insomnia, focal hypotonia of r hand    Review of Systems   Constitutional: Negative.    HENT: Negative.     Respiratory: Negative.     Cardiovascular: Negative.    Gastrointestinal: Negative.    Musculoskeletal: Negative.    Neurological: Negative.        Objective   /78   Pulse 62   Ht 1.626 m (5' 4\")   Wt 93 kg (205 lb)   BMI 35.19 kg/m²     Physical Exam  Vitals reviewed.   Constitutional:       Appearance: Normal appearance. She is normal weight.   Eyes:      Extraocular Movements: Extraocular movements intact.      Conjunctiva/sclera: Conjunctivae normal.      Pupils: Pupils are equal, round, and reactive to light.   Cardiovascular:      Rate and Rhythm: Normal rate and regular rhythm.      Pulses: Normal pulses.      Heart sounds: Normal heart sounds.   Pulmonary:      Effort: Pulmonary effort is normal.      Breath sounds: Normal breath sounds.   Abdominal:      General: Bowel sounds are normal.      Palpations: Abdomen is soft.   Musculoskeletal:         General: Normal range of motion.   Skin:     General: Skin is warm and dry.   Neurological:      General: No focal deficit present.      Mental Status: She is alert and oriented to person, place, and time. Mental status is at baseline.         Assessment/Plan   Problem List Items Addressed This Visit             ICD-10-CM    Class 2 obesity due to excess calories in adult E66.09    Deep vein thrombosis (DVT) (CMS/[facility]) I82.409    HTN (hypertension) I10    Relevant Medications    amLODIPine (Norvasc) 2.5 mg tablet    Hyperlipidemia E78.5    Relevant Orders    Referral to Cardiology    Hypothyroidism E03.9    Nicotine dependence F17.200    NSTEMI (non-ST elevated myocardial infarction) (CMS/[facility]) - Primary I21.4    Relevant Medications    amLODIPine (Norvasc) 2.5 mg tablet    Other " Relevant Orders    Referral to Cardiology     Other Visit Diagnoses         Codes    Focal dystonia     G24.8    Screening mammogram, encounter for     Z12.31    Relevant Orders    BI mammo bilateral screening tomosynthesis    Colon abnormality     K63.9    Relevant Orders    Cologuard® colon cancer screening    Cigarette smoker     F17.210    Relevant Orders    CT lung screening low dose        Uncont chol will see cardio and see about non stating injections  Hx of nstemi and decreased ef will see cardio and naomiley need repeat echo  Smoker w greater than 25 pack year hx still smoking get lung ct   Due for colonosocy  Focal dystonia referred to neuro  Copd stable [facility]  Htn uncotnt simon tart amlodipien and fu  Hypothyroid check levlena dfu  Due for mammogram

## 2024-01-03 ENCOUNTER — TELEPHONE (OUTPATIENT)
Dept: PRIMARY CARE | Facility: CLINIC | Age: 64
End: 2024-01-03
Payer: COMMERCIAL

## 2024-01-03 NOTE — TELEPHONE ENCOUNTER
Spoke to patient    ----- Message from Donte Hannon MD sent at 1/3/2024 10:29 AM EST -----  All results are stable  no change  Keep appointment w cardiology

## 2024-01-17 LAB — NONINV COLON CA DNA+OCC BLD SCRN STL QL: NEGATIVE

## 2024-01-26 ENCOUNTER — OFFICE VISIT (OUTPATIENT)
Dept: CARDIOLOGY | Facility: CLINIC | Age: 64
End: 2024-01-26
Payer: COMMERCIAL

## 2024-01-26 VITALS
OXYGEN SATURATION: 98 % | DIASTOLIC BLOOD PRESSURE: 76 MMHG | HEIGHT: 64 IN | HEART RATE: 68 BPM | SYSTOLIC BLOOD PRESSURE: 130 MMHG | BODY MASS INDEX: 34.83 KG/M2 | WEIGHT: 204 LBS

## 2024-01-26 DIAGNOSIS — R06.09 DOE (DYSPNEA ON EXERTION): Primary | ICD-10-CM

## 2024-01-26 DIAGNOSIS — I25.10 CORONARY ARTERY DISEASE INVOLVING NATIVE CORONARY ARTERY OF NATIVE HEART WITHOUT ANGINA PECTORIS: ICD-10-CM

## 2024-01-26 DIAGNOSIS — I25.84 CORONARY ARTERY CALCIFICATION: ICD-10-CM

## 2024-01-26 DIAGNOSIS — E78.5 DYSLIPIDEMIA: ICD-10-CM

## 2024-01-26 DIAGNOSIS — I25.10 CORONARY ARTERY CALCIFICATION: ICD-10-CM

## 2024-01-26 DIAGNOSIS — I42.8 OTHER CARDIOMYOPATHY (MULTI): ICD-10-CM

## 2024-01-26 DIAGNOSIS — G45.9 TIA (TRANSIENT ISCHEMIC ATTACK): ICD-10-CM

## 2024-01-26 PROCEDURE — 99205 OFFICE O/P NEW HI 60 MIN: CPT | Performed by: STUDENT IN AN ORGANIZED HEALTH CARE EDUCATION/TRAINING PROGRAM

## 2024-01-26 PROCEDURE — 3008F BODY MASS INDEX DOCD: CPT | Performed by: STUDENT IN AN ORGANIZED HEALTH CARE EDUCATION/TRAINING PROGRAM

## 2024-01-26 PROCEDURE — 3078F DIAST BP <80 MM HG: CPT | Performed by: STUDENT IN AN ORGANIZED HEALTH CARE EDUCATION/TRAINING PROGRAM

## 2024-01-26 PROCEDURE — 3075F SYST BP GE 130 - 139MM HG: CPT | Performed by: STUDENT IN AN ORGANIZED HEALTH CARE EDUCATION/TRAINING PROGRAM

## 2024-01-26 RX ORDER — REGADENOSON 0.08 MG/ML
0.4 INJECTION, SOLUTION INTRAVENOUS
Status: CANCELLED | OUTPATIENT
Start: 2024-01-26

## 2024-01-26 NOTE — PATIENT INSTRUCTIONS
We will obtain a transthoracic echocardiogram for structural evaluation including ejection fraction, assessment of regional wall motion abnormalities or valvular disease, and further evaluation of hemodynamics.    We will obtain a nuclear pharmacologic stress test for further ischemic evaluation. Please avoid caffeine the day prior to and the day of the stress test. Do not eat the morning of the stress test or 12 hours prior.      Will attempt to gain approval for Repatha.    Please continue current cardiac medications including Eliquis 5 mg twice daily, amlodipine 2.5 mg daily.    Please followup with me in Cardiology clinic within the next 6 to 8 weeks.  Please return to clinic sooner or seek emergent care if your symptoms reoccur or worsen.

## 2024-01-26 NOTE — PROGRESS NOTES
Referred by Dr. Hannon for New Patient Visit (Elevated cholesterol, PCP referral)     HPI:    Celeste Dave is a 63 y.o. female with pertinent history of cervical cancer, TIA, active tobacco abuse, hypertension, acute pulmonary embolism status post IVC filter in 2021 on Eliquis, dyslipidemia, intolerance to multiple statins with severe myalgia, family history of premature coronary artery disease, history of moderate cardiomyopathy with ejection fraction 35 to 40% 6/30/2018, recovery of ejection fraction to 55% on echo performed 7/2/2018, severely elevated coronary calcium score 574 predominantly involving the LAD and RCA consistent with the degree of coronary artery disease on CT calcium score performed 5/12/2023 presents to cardiology clinic to establish care.     Is doing relatively well.  She does note relatively stable dyspnea on exertion.  She does note some occasional chest discomfort that she believes is more like indigestion.  We reviewed her prior history and hospitalizations.  We discussed the natural history of cardiomyopathy in the past.  No exacerbating or relieving factors.  Pt denies orthopnea, and paroxysmal nocturnal dyspnea.  Pt denies worsening lower extremity edema.  Pt denies palpitations or syncope.  No recent falls.  No fever or chills.  No cough.  No change in bowel or bladder habits.  No sick contacts.  No recent travel.    12 point review of systems including (Constitutional, Eyes, ENMT, Respiratory, Cardiac, Gastrointestinal, Neurological, Psychiatric, and Hematologic) was performed and is otherwise negative.    Past medical history reviewed:   has a past medical history of Candidiasis, unspecified (03/26/2015), Other abnormal and inconclusive findings on diagnostic imaging of breast, Personal history of other diseases of the nervous system and sense organs (01/20/2015), and Personal history of pulmonary embolism.    Past surgical history reviewed:   has a past surgical history that  includes Cholecystectomy (2018); Total abdominal hysterectomy w/ bilateral salpingoophorectomy (2018); Other surgical history (2018);  section, classic (2018); Exploratory laparotomy (2018); Breast biopsy (2018); and CT angio coronary art with heartflow if score >30% (2018).    Social history reviewed:   reports that she has been smoking cigarettes. She has never used smokeless tobacco. No history on file for alcohol use and drug use.     Family history reviewed: maternal uncle with MI at 50.  Maternal grandmother CAD    Allergies reviewed: Atorvastatin, Moxifloxacin, and Pravastatin     Medications reviewed:   Current Outpatient Medications   Medication Instructions    amLODIPine (NORVASC) 2.5 mg, oral, Daily    apixaban (ELIQUIS) 5 mg, oral, 2 times daily    ezetimibe (ZETIA) 10 mg, oral, Daily    levothyroxine (SYNTHROID) 112 mcg, oral, Daily    traZODone (DESYREL) 100 mg, oral, Nightly PRN        Vitals reviewed: Visit Vitals  /76   Pulse 68       Physical Exam:   General:  Patient is awake, alert, and oriented.  Patient is in no acute distress.  HEENT:  Pupils equal and reactive.  Normocephalic.  Moist mucosa.    Neck:  No thyromegaly.  Normal Jugular Venous Pressure.  Cardiovascular:  Regular rate and rhythm.  Normal S1 and S2.  1/6 REDD.  Pulmonary:  Clear to auscultation bilaterally.  Abdomen:  Soft. Non-tender.   Non-distended.  Positive bowel sounds.  Lower Extremities:  2+ pedal pulses. No LE edema.  Neurologic:  Cranial nerves intact.  No focal deficit.   Skin: Skin warm and dry, normal skin turgor.   Psychiatric: Normal affect.    Last Labs:  CBC -      Lab Results   Component Value Date    WBC 8.3 2023    HGB 13.0 2023    HCT 41.1 2023     2023        CMP-  Lab Results   Component Value Date    GLUCOSE 84 2024     2024    K 4.3 2024     2024    CO2 28 2024    ANIONGAP 12  01/02/2024    BUN 10 01/02/2024    CREATININE 0.72 01/02/2024    EGFR >90 01/02/2024    CALCIUM 9.7 01/02/2024    PROT 6.8 01/02/2024    ALBUMIN 4.1 01/02/2024    AST 22 01/02/2024    ALT 15 01/02/2024    ALKPHOS 67 01/02/2024    BILITOT 0.3 01/02/2024        LIPIDS-  Lab Results   Component Value Date    CHOL 217 (H) 01/02/2024    TRIG 64 01/02/2024    HDL 72.5 01/02/2024    CHHDL 3.0 01/02/2024    VLDL 13 01/02/2024        OTHERS-  Lab Results   Component Value Date    HGBA1C 5.3 06/29/2018     (H) 06/30/2018        I personally reviewed the patient's recent vitals, labs, medications, orders, EKGs, pertinent cardiac imaging/ echocardiography and ischemic evaluations including stress testing/ cardiac catheterization.    Assessment and Plan:    Celeste Dave is a 63 y.o. female with pertinent history of cervical cancer, TIA, active tobacco abuse, hypertension, acute pulmonary embolism status post IVC filter in 2021 on Eliquis, dyslipidemia, intolerance to multiple statins with severe myalgia, family history of premature coronary artery disease, history of moderate cardiomyopathy with ejection fraction 35 to 40% 6/30/2018, recovery of ejection fraction to 55% on echo performed 7/2/2018, severely elevated coronary calcium score 574 predominantly involving the LAD and RCA consistent with the degree of coronary artery disease on CT calcium score performed 5/12/2023 presents to cardiology clinic to establish care.  Is doing relatively well.  She does note relatively stable dyspnea on exertion.  She does note some occasional chest discomfort that she believes is more like indigestion.  We reviewed her prior history and hospitalizations.  We discussed the natural history of cardiomyopathy in the past.      Of note, her insurance did not originally approve Repatha.  This addendum is to note that the patient has been unable to tolerate any dosing of statin with ezetimibe.  Ezetimibe by itself has been unsuccessful to  adequately control lipids given her underlying coronary artery disease and limited by side effects in the past.    We will obtain a transthoracic echocardiogram for structural evaluation including ejection fraction, assessment of regional wall motion abnormalities or valvular disease, and further evaluation of hemodynamics.    The patient is unable to complete treadmill excercise stress testing with less than 4 METS of functional capacity due to comorbidities including significant osteoarthritis.  Given the patient's risk factors and symptoms, we will obtain a nuclear pharmacologic stress test for further ischemic evaluation. Please avoid caffeine the day prior to and the day of the stress test. Do not eat the morning of the stress test or 12 hours prior.      Will attempt to gain approval for Repatha.    Please continue current cardiac medications including Eliquis 5 mg twice daily, amlodipine 2.5 mg daily.    Please followup with me in Cardiology clinic within the next 6 to 8 weeks.  Please return to clinic sooner or seek emergent care if your symptoms reoccur or worsen.    Thank you for allowing me to participate in their care.  Please feel free to call me with any further questions or concerns.        Jt Torres MD, FACC, MA[facility]IC[facility] MARRERO  Division of Cardiovascular Medicine  Medical Director, [facility] [place]  Clinical , [facility] [employer]  [email or web address]   Office:  [phone]

## 2024-02-06 ENCOUNTER — HOSPITAL ENCOUNTER (OUTPATIENT)
Dept: RADIOLOGY | Facility: HOSPITAL | Age: 64
Discharge: HOME | End: 2024-02-06
Payer: COMMERCIAL

## 2024-02-06 ENCOUNTER — HOSPITAL ENCOUNTER (OUTPATIENT)
Dept: CARDIOLOGY | Facility: HOSPITAL | Age: 64
Discharge: HOME | End: 2024-02-06
Payer: COMMERCIAL

## 2024-02-06 DIAGNOSIS — I25.10 CORONARY ARTERY DISEASE INVOLVING NATIVE CORONARY ARTERY OF NATIVE HEART WITHOUT ANGINA PECTORIS: ICD-10-CM

## 2024-02-06 DIAGNOSIS — I42.8 OTHER CARDIOMYOPATHY (MULTI): ICD-10-CM

## 2024-02-06 DIAGNOSIS — E78.5 DYSLIPIDEMIA: ICD-10-CM

## 2024-02-06 DIAGNOSIS — I25.10 CORONARY ARTERY CALCIFICATION: ICD-10-CM

## 2024-02-06 DIAGNOSIS — R06.09 DOE (DYSPNEA ON EXERTION): ICD-10-CM

## 2024-02-06 DIAGNOSIS — G45.9 TIA (TRANSIENT ISCHEMIC ATTACK): ICD-10-CM

## 2024-02-06 DIAGNOSIS — I25.84 CORONARY ARTERY CALCIFICATION: ICD-10-CM

## 2024-02-06 PROCEDURE — 78452 HT MUSCLE IMAGE SPECT MULT: CPT

## 2024-02-06 PROCEDURE — 3430000001 HC RX 343 DIAGNOSTIC RADIOPHARMACEUTICALS: Performed by: STUDENT IN AN ORGANIZED HEALTH CARE EDUCATION/TRAINING PROGRAM

## 2024-02-06 PROCEDURE — 93017 CV STRESS TEST TRACING ONLY: CPT

## 2024-02-06 PROCEDURE — A9502 TC99M TETROFOSMIN: HCPCS | Performed by: STUDENT IN AN ORGANIZED HEALTH CARE EDUCATION/TRAINING PROGRAM

## 2024-02-06 PROCEDURE — 78452 HT MUSCLE IMAGE SPECT MULT: CPT | Performed by: STUDENT IN AN ORGANIZED HEALTH CARE EDUCATION/TRAINING PROGRAM

## 2024-02-06 PROCEDURE — 93016 CV STRESS TEST SUPVJ ONLY: CPT | Performed by: STUDENT IN AN ORGANIZED HEALTH CARE EDUCATION/TRAINING PROGRAM

## 2024-02-06 PROCEDURE — 2500000004 HC RX 250 GENERAL PHARMACY W/ HCPCS (ALT 636 FOR OP/ED): Performed by: STUDENT IN AN ORGANIZED HEALTH CARE EDUCATION/TRAINING PROGRAM

## 2024-02-06 RX ORDER — REGADENOSON 0.08 MG/ML
0.4 INJECTION, SOLUTION INTRAVENOUS
Status: COMPLETED | OUTPATIENT
Start: 2024-02-06 | End: 2024-02-06

## 2024-02-06 RX ADMIN — REGADENOSON 0.4 MG: 0.08 INJECTION, SOLUTION INTRAVENOUS at 13:21

## 2024-02-06 RX ADMIN — TETROFOSMIN 10.3 MILLICURIE: 0.23 INJECTION, POWDER, LYOPHILIZED, FOR SOLUTION INTRAVENOUS at 11:41

## 2024-02-06 RX ADMIN — TETROFOSMIN 33.4 MILLICURIE: 0.23 INJECTION, POWDER, LYOPHILIZED, FOR SOLUTION INTRAVENOUS at 13:25

## 2024-02-08 ENCOUNTER — OFFICE VISIT (OUTPATIENT)
Dept: CARDIOLOGY | Facility: CLINIC | Age: 64
End: 2024-02-08
Payer: COMMERCIAL

## 2024-02-08 ENCOUNTER — TELEPHONE (OUTPATIENT)
Dept: CARDIOLOGY | Facility: CLINIC | Age: 64
End: 2024-02-08

## 2024-02-08 VITALS
HEART RATE: 67 BPM | HEIGHT: 64 IN | BODY MASS INDEX: 34.83 KG/M2 | OXYGEN SATURATION: 98 % | WEIGHT: 204 LBS | DIASTOLIC BLOOD PRESSURE: 60 MMHG | SYSTOLIC BLOOD PRESSURE: 118 MMHG

## 2024-02-08 DIAGNOSIS — I42.8 OTHER CARDIOMYOPATHY (MULTI): ICD-10-CM

## 2024-02-08 DIAGNOSIS — R07.89 ATYPICAL CHEST PAIN: ICD-10-CM

## 2024-02-08 DIAGNOSIS — I25.10 CORONARY ARTERY DISEASE INVOLVING NATIVE CORONARY ARTERY OF NATIVE HEART WITHOUT ANGINA PECTORIS: ICD-10-CM

## 2024-02-08 DIAGNOSIS — I25.84 CORONARY ARTERY CALCIFICATION: ICD-10-CM

## 2024-02-08 DIAGNOSIS — R94.39 ABNORMAL STRESS TEST: Primary | ICD-10-CM

## 2024-02-08 DIAGNOSIS — I25.10 CORONARY ARTERY CALCIFICATION: ICD-10-CM

## 2024-02-08 DIAGNOSIS — E78.5 DYSLIPIDEMIA: ICD-10-CM

## 2024-02-08 DIAGNOSIS — G45.9 TIA (TRANSIENT ISCHEMIC ATTACK): ICD-10-CM

## 2024-02-08 DIAGNOSIS — R06.09 DOE (DYSPNEA ON EXERTION): ICD-10-CM

## 2024-02-08 PROCEDURE — 3074F SYST BP LT 130 MM HG: CPT | Performed by: STUDENT IN AN ORGANIZED HEALTH CARE EDUCATION/TRAINING PROGRAM

## 2024-02-08 PROCEDURE — 99215 OFFICE O/P EST HI 40 MIN: CPT | Performed by: STUDENT IN AN ORGANIZED HEALTH CARE EDUCATION/TRAINING PROGRAM

## 2024-02-08 PROCEDURE — 3008F BODY MASS INDEX DOCD: CPT | Performed by: STUDENT IN AN ORGANIZED HEALTH CARE EDUCATION/TRAINING PROGRAM

## 2024-02-08 PROCEDURE — 3078F DIAST BP <80 MM HG: CPT | Performed by: STUDENT IN AN ORGANIZED HEALTH CARE EDUCATION/TRAINING PROGRAM

## 2024-02-08 NOTE — PATIENT INSTRUCTIONS
We will obtain a transthoracic echocardiogram for structural evaluation including ejection fraction, assessment of regional wall motion abnormalities or valvular disease, and further evaluation of hemodynamics.    The patient's overall symptomatology and risk factors are concerning for possible obstructive coronary artery disease. Multiple options were discussed with the patient including alternatives, risks and benefits of cardiac catheterization. After prolonged discussion, it was decided to proceed with cardiac catheterization with coronary angiography.  This will be scheduled at [facility].  You will also need a renal function panel blood work the week of the cardiac catheterization that has been ordered and can be completed at the lab.    Please stop Eliquis 3 days prior to cardiac catheterization we will restart after.    Please continue current cardiac medications including Eliquis 5 mg twice daily, amlodipine 2.5 mg daily, Repatha.    Please followup with me in Cardiology clinic within the next 6 to 8 weeks.  Please return to clinic sooner or seek emergent care if your symptoms reoccur or worsen.

## 2024-02-08 NOTE — PROGRESS NOTES
Follow-up (Stress test results)     HPI:    Celeste Dave is a 63 y.o. female with pertinent history of cervical cancer, TIA, active tobacco abuse, hypertension, acute pulmonary embolism status post IVC filter in 2021 on Eliquis, dyslipidemia, intolerance to multiple statins with severe myalgia, family history of premature coronary artery disease, history of moderate cardiomyopathy with ejection fraction 35 to 40% 6/30/2018, recovery of ejection fraction to 55% on echo performed 7/2/2018, severely elevated coronary calcium score 574 predominantly involving the LAD and RCA consistent with the degree of coronary artery disease on CT calcium score performed 5/12/2023, concern for inferior wall ischemia on pharmacologic nuclear stress test presents to cardiology clinic for follow-up after abnormalities on stress test.    She is relatively stable.  We have prolonged discussion about her recent cardiac stress test as well as the natural history of coronary artery disease.  She is relatively stable dyspnea on exertion.  She does note some occasional chest discomfort that she believes is more like indigestion.  We reviewed her prior history and hospitalizations. No exacerbating or relieving factors.  Pt denies orthopnea, and paroxysmal nocturnal dyspnea.  Pt denies worsening lower extremity edema.  Pt denies palpitations or syncope.  No recent falls.  No fever or chills.  No cough.  No change in bowel or bladder habits.  No sick contacts.  No recent travel.    12 point review of systems including (Constitutional, Eyes, ENMT, Respiratory, Cardiac, Gastrointestinal, Neurological, Psychiatric, and Hematologic) was performed and is otherwise negative.    Past medical history reviewed:   has a past medical history of Candidiasis, unspecified (03/26/2015), Other abnormal and inconclusive findings on diagnostic imaging of breast, Personal history of other diseases of the nervous system and sense organs (01/20/2015), and Personal  history of pulmonary embolism.    Past surgical history reviewed:   has a past surgical history that includes Cholecystectomy (2018); Total abdominal hysterectomy w/ bilateral salpingoophorectomy (2018); Other surgical history (2018);  section, classic (2018); Exploratory laparotomy (2018); Breast biopsy (2018); and CT angio coronary art with heartflow if score >30% (2018).    Social history reviewed:   reports that she has been smoking cigarettes. She has never used smokeless tobacco. No history on file for alcohol use and drug use.     Family history reviewed: maternal uncle with MI at 50.  Maternal grandmother CAD    Allergies reviewed: Atorvastatin, Moxifloxacin, and Pravastatin     Medications reviewed:   Current Outpatient Medications   Medication Instructions    amLODIPine (NORVASC) 2.5 mg, oral, Daily    apixaban (ELIQUIS) 5 mg, oral, 2 times daily    evolocumab with infusor (REPATHA PUSHTRONEX) 420 mg, subcutaneous, Every 28 days    ezetimibe (ZETIA) 10 mg, oral, Daily    levothyroxine (SYNTHROID) 112 mcg, oral, Daily    traZODone (DESYREL) 100 mg, oral, Nightly PRN        Vitals reviewed: Visit Vitals  /60   Pulse 67       Physical Exam:   General:  Patient is awake, alert, and oriented.  Patient is in no acute distress.  HEENT:  Pupils equal and reactive.  Normocephalic.  Moist mucosa.    Neck:  No thyromegaly.  Normal Jugular Venous Pressure.  Cardiovascular:  Regular rate and rhythm.  Normal S1 and S2.  1/6 REDD.  Pulmonary:  Clear to auscultation bilaterally.  Abdomen:  Soft. Non-tender.   Non-distended.  Positive bowel sounds.  Lower Extremities:  2+ pedal pulses. No LE edema.  Neurologic:  Cranial nerves intact.  No focal deficit.   Skin: Skin warm and dry, normal skin turgor.   Psychiatric: Normal affect.    Last Labs:  CBC -      Lab Results   Component Value Date    WBC 8.3 2023    HGB 13.0 2023    HCT 41.1 2023      05/30/2023        CMP-  Lab Results   Component Value Date    GLUCOSE 84 01/02/2024     01/02/2024    K 4.3 01/02/2024     01/02/2024    CO2 28 01/02/2024    ANIONGAP 12 01/02/2024    BUN 10 01/02/2024    CREATININE 0.72 01/02/2024    EGFR >90 01/02/2024    CALCIUM 9.7 01/02/2024    PROT 6.8 01/02/2024    ALBUMIN 4.1 01/02/2024    AST 22 01/02/2024    ALT 15 01/02/2024    ALKPHOS 67 01/02/2024    BILITOT 0.3 01/02/2024        LIPIDS-  Lab Results   Component Value Date    CHOL 217 (H) 01/02/2024    TRIG 64 01/02/2024    HDL 72.5 01/02/2024    CHHDL 3.0 01/02/2024    VLDL 13 01/02/2024        OTHERS-  Lab Results   Component Value Date    HGBA1C 5.3 06/29/2018     (H) 06/30/2018        I personally reviewed the patient's recent vitals, labs, medications, orders, EKGs, pertinent cardiac imaging/ echocardiography and ischemic evaluations including stress testing/ cardiac catheterization.    Assessment and Plan:    Celeste Dave is a 63 y.o. female with pertinent history of cervical cancer, TIA, active tobacco abuse, hypertension, acute pulmonary embolism status post IVC filter in 2021 on Eliquis, dyslipidemia, intolerance to multiple statins with severe myalgia, family history of premature coronary artery disease, history of moderate cardiomyopathy with ejection fraction 35 to 40% 6/30/2018, recovery of ejection fraction to 55% on echo performed 7/2/2018, severely elevated coronary calcium score 574 predominantly involving the LAD and RCA consistent with the degree of coronary artery disease on CT calcium score performed 5/12/2023, concern for inferior wall ischemia on pharmacologic nuclear stress test presents to cardiology clinic for follow-up after abnormalities on stress test.  She is relatively stable.  We have prolonged discussion about her recent cardiac stress test as well as the natural history of coronary artery disease.  She is relatively stable dyspnea on exertion.  She does note some  occasional chest discomfort that she believes is more like indigestion.  We reviewed her prior history and hospitalizations.       We will obtain a transthoracic echocardiogram for structural evaluation including ejection fraction, assessment of regional wall motion abnormalities or valvular disease, and further evaluation of hemodynamics.    The patient's overall symptomatology and risk factors are concerning for possible obstructive coronary artery disease. Multiple options were discussed with the patient including alternatives, risks and benefits of cardiac catheterization. After prolonged discussion, it was decided to proceed with cardiac catheterization with coronary angiography.  This will be scheduled at [facility].  You will also need a renal function panel blood work the week of the cardiac catheterization that has been ordered and can be completed at the lab.    Please stop Eliquis 3 days prior to cardiac catheterization we will restart after.    Please continue current cardiac medications including Eliquis 5 mg twice daily, amlodipine 2.5 mg daily, Repatha.    Please followup with me in Cardiology clinic within the next 6 to 8 weeks.  Please return to clinic sooner or seek emergent care if your symptoms reoccur or worsen.    Thank you for allowing me to participate in their care.  Please feel free to call me with any further questions or concerns.        Jt Torres MD, FACC, MAURICIO MARRERO  Division of Cardiovascular Medicine  Medical Director, [facility] [place]  Clinical , [facility] [employer]  [email or web address]   Office:  [phone]

## 2024-02-14 ENCOUNTER — APPOINTMENT (OUTPATIENT)
Dept: RADIOLOGY | Facility: HOSPITAL | Age: 64
End: 2024-02-14
Payer: COMMERCIAL

## 2024-02-14 ENCOUNTER — APPOINTMENT (OUTPATIENT)
Dept: RADIOLOGY | Facility: CLINIC | Age: 64
End: 2024-02-14
Payer: COMMERCIAL

## 2024-02-19 ENCOUNTER — HOSPITAL ENCOUNTER (OUTPATIENT)
Dept: RADIOLOGY | Facility: CLINIC | Age: 64
Discharge: HOME | End: 2024-02-19
Payer: COMMERCIAL

## 2024-02-19 ENCOUNTER — HOSPITAL ENCOUNTER (OUTPATIENT)
Dept: RADIOLOGY | Facility: HOSPITAL | Age: 64
Discharge: HOME | End: 2024-02-19
Payer: COMMERCIAL

## 2024-02-19 VITALS — HEIGHT: 64 IN | BODY MASS INDEX: 34.82 KG/M2 | WEIGHT: 203.93 LBS

## 2024-02-19 DIAGNOSIS — Z12.31 SCREENING MAMMOGRAM, ENCOUNTER FOR: ICD-10-CM

## 2024-02-19 DIAGNOSIS — F17.210 CIGARETTE SMOKER: ICD-10-CM

## 2024-02-19 PROCEDURE — 77063 BREAST TOMOSYNTHESIS BI: CPT | Performed by: STUDENT IN AN ORGANIZED HEALTH CARE EDUCATION/TRAINING PROGRAM

## 2024-02-19 PROCEDURE — 71271 CT THORAX LUNG CANCER SCR C-: CPT

## 2024-02-19 PROCEDURE — 77067 SCR MAMMO BI INCL CAD: CPT

## 2024-02-19 PROCEDURE — 77067 SCR MAMMO BI INCL CAD: CPT | Performed by: STUDENT IN AN ORGANIZED HEALTH CARE EDUCATION/TRAINING PROGRAM

## 2024-02-23 ENCOUNTER — TELEPHONE (OUTPATIENT)
Dept: PRIMARY CARE | Facility: CLINIC | Age: 64
End: 2024-02-23
Payer: COMMERCIAL

## 2024-02-23 DIAGNOSIS — E78.5 DYSLIPIDEMIA: ICD-10-CM

## 2024-02-23 DIAGNOSIS — F51.01 PRIMARY INSOMNIA: ICD-10-CM

## 2024-02-23 RX ORDER — TRAZODONE HYDROCHLORIDE 100 MG/1
100 TABLET ORAL NIGHTLY PRN
Qty: 90 TABLET | Refills: 1 | Status: SHIPPED | OUTPATIENT
Start: 2024-02-23 | End: 2024-08-21

## 2024-02-23 RX ORDER — EZETIMIBE 10 MG/1
10 TABLET ORAL DAILY
Qty: 90 TABLET | Refills: 1 | Status: SHIPPED | OUTPATIENT
Start: 2024-02-23 | End: 2024-08-21

## 2024-02-29 ENCOUNTER — ANCILLARY PROCEDURE (OUTPATIENT)
Dept: CARDIOLOGY | Facility: CLINIC | Age: 64
End: 2024-02-29
Payer: COMMERCIAL

## 2024-02-29 DIAGNOSIS — I25.10 CORONARY ARTERY CALCIFICATION: ICD-10-CM

## 2024-02-29 DIAGNOSIS — I25.10 CORONARY ARTERY DISEASE INVOLVING NATIVE CORONARY ARTERY OF NATIVE HEART WITHOUT ANGINA PECTORIS: ICD-10-CM

## 2024-02-29 DIAGNOSIS — I25.84 CORONARY ARTERY CALCIFICATION: ICD-10-CM

## 2024-02-29 DIAGNOSIS — G45.9 TIA (TRANSIENT ISCHEMIC ATTACK): ICD-10-CM

## 2024-02-29 DIAGNOSIS — I42.8 OTHER CARDIOMYOPATHY (MULTI): ICD-10-CM

## 2024-02-29 DIAGNOSIS — E78.5 DYSLIPIDEMIA: ICD-10-CM

## 2024-02-29 DIAGNOSIS — R06.09 DOE (DYSPNEA ON EXERTION): ICD-10-CM

## 2024-02-29 LAB
AORTIC VALVE PEAK VELOCITY: 1.55 M/S
AV PEAK GRADIENT: 9.6 MMHG
AVA (PEAK VEL): 2.43 CM2
LEFT ATRIUM VOLUME AREA LENGTH INDEX BSA: 27 ML/M2
LEFT VENTRICLE INTERNAL DIMENSION DIASTOLE: 4.64 CM (ref 3.5–6)
LEFT VENTRICULAR OUTFLOW TRACT DIAMETER: 2.05 CM
MITRAL VALVE E/A RATIO: 1.12
MITRAL VALVE E/E' RATIO: 8.73
RIGHT VENTRICLE FREE WALL PEAK S': 10 CM/S
TRICUSPID ANNULAR PLANE SYSTOLIC EXCURSION: 2.1 CM

## 2024-02-29 PROCEDURE — 93306 TTE W/DOPPLER COMPLETE: CPT | Performed by: STUDENT IN AN ORGANIZED HEALTH CARE EDUCATION/TRAINING PROGRAM

## 2024-02-29 PROCEDURE — 93306 TTE W/DOPPLER COMPLETE: CPT

## 2024-03-11 ENCOUNTER — HOSPITAL ENCOUNTER (OUTPATIENT)
Facility: HOSPITAL | Age: 64
Setting detail: OUTPATIENT SURGERY
Discharge: HOME | End: 2024-03-11
Attending: STUDENT IN AN ORGANIZED HEALTH CARE EDUCATION/TRAINING PROGRAM | Admitting: STUDENT IN AN ORGANIZED HEALTH CARE EDUCATION/TRAINING PROGRAM
Payer: COMMERCIAL

## 2024-03-11 VITALS
BODY MASS INDEX: 34.66 KG/M2 | DIASTOLIC BLOOD PRESSURE: 68 MMHG | HEART RATE: 62 BPM | RESPIRATION RATE: 16 BRPM | WEIGHT: 203 LBS | HEIGHT: 64 IN | TEMPERATURE: 98.1 F | OXYGEN SATURATION: 98 % | SYSTOLIC BLOOD PRESSURE: 155 MMHG

## 2024-03-11 DIAGNOSIS — I48.11 LONGSTANDING PERSISTENT ATRIAL FIBRILLATION (MULTI): ICD-10-CM

## 2024-03-11 DIAGNOSIS — R07.89 ATYPICAL CHEST PAIN: ICD-10-CM

## 2024-03-11 DIAGNOSIS — R93.1 ABNORMAL FINDINGS ON DIAGNOSTIC IMAGING OF HEART AND CORONARY CIRCULATION: ICD-10-CM

## 2024-03-11 DIAGNOSIS — R07.9 CHEST PAIN, UNSPECIFIED: ICD-10-CM

## 2024-03-11 DIAGNOSIS — R94.39 ABNORMAL STRESS TEST: Primary | ICD-10-CM

## 2024-03-11 LAB
ANION GAP SERPL CALC-SCNC: 10 MMOL/L (ref 10–20)
BUN SERPL-MCNC: 9 MG/DL (ref 6–23)
CALCIUM SERPL-MCNC: 8.7 MG/DL (ref 8.6–10.3)
CHLORIDE SERPL-SCNC: 106 MMOL/L (ref 98–107)
CO2 SERPL-SCNC: 28 MMOL/L (ref 21–32)
CREAT SERPL-MCNC: 0.71 MG/DL (ref 0.5–1.05)
EGFRCR SERPLBLD CKD-EPI 2021: >90 ML/MIN/1.73M*2
ERYTHROCYTE [DISTWIDTH] IN BLOOD BY AUTOMATED COUNT: 14.1 % (ref 11.5–14.5)
GLUCOSE SERPL-MCNC: 83 MG/DL (ref 74–99)
HCT VFR BLD AUTO: 42.2 % (ref 36–46)
HGB BLD-MCNC: 13.7 G/DL (ref 12–16)
MCH RBC QN AUTO: 31.8 PG (ref 26–34)
MCHC RBC AUTO-ENTMCNC: 32.5 G/DL (ref 32–36)
MCV RBC AUTO: 98 FL (ref 80–100)
NRBC BLD-RTO: 0 /100 WBCS (ref 0–0)
PLATELET # BLD AUTO: 326 X10*3/UL (ref 150–450)
POTASSIUM SERPL-SCNC: 4.4 MMOL/L (ref 3.5–5.3)
RBC # BLD AUTO: 4.31 X10*6/UL (ref 4–5.2)
SODIUM SERPL-SCNC: 140 MMOL/L (ref 136–145)
WBC # BLD AUTO: 6 X10*3/UL (ref 4.4–11.3)

## 2024-03-11 PROCEDURE — 2500000004 HC RX 250 GENERAL PHARMACY W/ HCPCS (ALT 636 FOR OP/ED): Performed by: STUDENT IN AN ORGANIZED HEALTH CARE EDUCATION/TRAINING PROGRAM

## 2024-03-11 PROCEDURE — 80048 BASIC METABOLIC PNL TOTAL CA: CPT | Performed by: NURSE PRACTITIONER

## 2024-03-11 PROCEDURE — C1760 CLOSURE DEV, VASC: HCPCS | Performed by: STUDENT IN AN ORGANIZED HEALTH CARE EDUCATION/TRAINING PROGRAM

## 2024-03-11 PROCEDURE — 99152 MOD SED SAME PHYS/QHP 5/>YRS: CPT | Performed by: STUDENT IN AN ORGANIZED HEALTH CARE EDUCATION/TRAINING PROGRAM

## 2024-03-11 PROCEDURE — 99153 MOD SED SAME PHYS/QHP EA: CPT | Performed by: STUDENT IN AN ORGANIZED HEALTH CARE EDUCATION/TRAINING PROGRAM

## 2024-03-11 PROCEDURE — 2500000004 HC RX 250 GENERAL PHARMACY W/ HCPCS (ALT 636 FOR OP/ED): Performed by: NURSE PRACTITIONER

## 2024-03-11 PROCEDURE — C1887 CATHETER, GUIDING: HCPCS | Performed by: STUDENT IN AN ORGANIZED HEALTH CARE EDUCATION/TRAINING PROGRAM

## 2024-03-11 PROCEDURE — 2780000003 HC OR 278 NO HCPCS: Performed by: STUDENT IN AN ORGANIZED HEALTH CARE EDUCATION/TRAINING PROGRAM

## 2024-03-11 PROCEDURE — C1769 GUIDE WIRE: HCPCS | Performed by: STUDENT IN AN ORGANIZED HEALTH CARE EDUCATION/TRAINING PROGRAM

## 2024-03-11 PROCEDURE — 2500000005 HC RX 250 GENERAL PHARMACY W/O HCPCS: Performed by: STUDENT IN AN ORGANIZED HEALTH CARE EDUCATION/TRAINING PROGRAM

## 2024-03-11 PROCEDURE — 2720000007 HC OR 272 NO HCPCS: Performed by: STUDENT IN AN ORGANIZED HEALTH CARE EDUCATION/TRAINING PROGRAM

## 2024-03-11 PROCEDURE — 36415 COLL VENOUS BLD VENIPUNCTURE: CPT | Performed by: NURSE PRACTITIONER

## 2024-03-11 PROCEDURE — 7100000009 HC PHASE TWO TIME - INITIAL BASE CHARGE: Performed by: STUDENT IN AN ORGANIZED HEALTH CARE EDUCATION/TRAINING PROGRAM

## 2024-03-11 PROCEDURE — C1894 INTRO/SHEATH, NON-LASER: HCPCS | Performed by: STUDENT IN AN ORGANIZED HEALTH CARE EDUCATION/TRAINING PROGRAM

## 2024-03-11 PROCEDURE — 93458 L HRT ARTERY/VENTRICLE ANGIO: CPT | Performed by: STUDENT IN AN ORGANIZED HEALTH CARE EDUCATION/TRAINING PROGRAM

## 2024-03-11 PROCEDURE — 2500000001 HC RX 250 WO HCPCS SELF ADMINISTERED DRUGS (ALT 637 FOR MEDICARE OP): Performed by: NURSE PRACTITIONER

## 2024-03-11 PROCEDURE — 7100000010 HC PHASE TWO TIME - EACH INCREMENTAL 1 MINUTE: Performed by: STUDENT IN AN ORGANIZED HEALTH CARE EDUCATION/TRAINING PROGRAM

## 2024-03-11 PROCEDURE — 85027 COMPLETE CBC AUTOMATED: CPT | Performed by: NURSE PRACTITIONER

## 2024-03-11 PROCEDURE — 99222 1ST HOSP IP/OBS MODERATE 55: CPT | Performed by: NURSE PRACTITIONER

## 2024-03-11 RX ORDER — FENTANYL CITRATE 50 UG/ML
INJECTION, SOLUTION INTRAMUSCULAR; INTRAVENOUS AS NEEDED
Status: DISCONTINUED | OUTPATIENT
Start: 2024-03-11 | End: 2024-03-11 | Stop reason: HOSPADM

## 2024-03-11 RX ORDER — ACETAMINOPHEN 160 MG/5ML
650 SOLUTION ORAL EVERY 6 HOURS PRN
Status: DISCONTINUED | OUTPATIENT
Start: 2024-03-11 | End: 2024-03-12 | Stop reason: HOSPADM

## 2024-03-11 RX ORDER — LIDOCAINE HYDROCHLORIDE 20 MG/ML
INJECTION, SOLUTION INFILTRATION; PERINEURAL AS NEEDED
Status: DISCONTINUED | OUTPATIENT
Start: 2024-03-11 | End: 2024-03-11 | Stop reason: HOSPADM

## 2024-03-11 RX ORDER — ACETAMINOPHEN 325 MG/1
650 TABLET ORAL EVERY 6 HOURS PRN
Status: DISCONTINUED | OUTPATIENT
Start: 2024-03-11 | End: 2024-03-12 | Stop reason: HOSPADM

## 2024-03-11 RX ORDER — VERAPAMIL HYDROCHLORIDE 2.5 MG/ML
INJECTION, SOLUTION INTRAVENOUS AS NEEDED
Status: DISCONTINUED | OUTPATIENT
Start: 2024-03-11 | End: 2024-03-11 | Stop reason: HOSPADM

## 2024-03-11 RX ORDER — MIDAZOLAM HYDROCHLORIDE 1 MG/ML
INJECTION, SOLUTION INTRAMUSCULAR; INTRAVENOUS AS NEEDED
Status: DISCONTINUED | OUTPATIENT
Start: 2024-03-11 | End: 2024-03-11 | Stop reason: HOSPADM

## 2024-03-11 RX ORDER — HEPARIN SODIUM 1000 [USP'U]/ML
INJECTION, SOLUTION INTRAVENOUS; SUBCUTANEOUS AS NEEDED
Status: DISCONTINUED | OUTPATIENT
Start: 2024-03-11 | End: 2024-03-11 | Stop reason: HOSPADM

## 2024-03-11 RX ORDER — SODIUM CHLORIDE 9 MG/ML
1.5 INJECTION, SOLUTION INTRAVENOUS CONTINUOUS
Status: DISCONTINUED | OUTPATIENT
Start: 2024-03-11 | End: 2024-03-11 | Stop reason: HOSPADM

## 2024-03-11 RX ORDER — ACETAMINOPHEN 650 MG/1
650 SUPPOSITORY RECTAL EVERY 6 HOURS PRN
Status: DISCONTINUED | OUTPATIENT
Start: 2024-03-11 | End: 2024-03-12 | Stop reason: HOSPADM

## 2024-03-11 RX ORDER — SODIUM CHLORIDE 9 MG/ML
100 INJECTION, SOLUTION INTRAVENOUS CONTINUOUS
Status: DISCONTINUED | OUTPATIENT
Start: 2024-03-11 | End: 2024-03-11

## 2024-03-11 RX ORDER — NAPROXEN SODIUM 220 MG/1
324 TABLET, FILM COATED ORAL ONCE
Status: COMPLETED | OUTPATIENT
Start: 2024-03-11 | End: 2024-03-11

## 2024-03-11 RX ADMIN — SODIUM CHLORIDE 100 ML/HR: 9 INJECTION, SOLUTION INTRAVENOUS at 12:15

## 2024-03-11 RX ADMIN — ASPIRIN 81 MG CHEWABLE TABLET 324 MG: 81 TABLET CHEWABLE at 12:55

## 2024-03-11 ASSESSMENT — ENCOUNTER SYMPTOMS
HEMATOLOGIC/LYMPHATIC NEGATIVE: 1
ALLERGIC/IMMUNOLOGIC NEGATIVE: 1
PSYCHIATRIC NEGATIVE: 1
GASTROINTESTINAL NEGATIVE: 1
CARDIOVASCULAR NEGATIVE: 1
CONSTITUTIONAL NEGATIVE: 1
RESPIRATORY NEGATIVE: 1
ENDOCRINE NEGATIVE: 1
MUSCULOSKELETAL NEGATIVE: 1
EYES NEGATIVE: 1
NEUROLOGICAL NEGATIVE: 1

## 2024-03-11 ASSESSMENT — PAIN SCALES - GENERAL
PAINLEVEL_OUTOF10: 0 - NO PAIN

## 2024-03-11 ASSESSMENT — COLUMBIA-SUICIDE SEVERITY RATING SCALE - C-SSRS
6. HAVE YOU EVER DONE ANYTHING, STARTED TO DO ANYTHING, OR PREPARED TO DO ANYTHING TO END YOUR LIFE?: NO
1. IN THE PAST MONTH, HAVE YOU WISHED YOU WERE DEAD OR WISHED YOU COULD GO TO SLEEP AND NOT WAKE UP?: NO
2. HAVE YOU ACTUALLY HAD ANY THOUGHTS OF KILLING YOURSELF?: NO

## 2024-03-11 ASSESSMENT — PAIN - FUNCTIONAL ASSESSMENT
PAIN_FUNCTIONAL_ASSESSMENT: 0-10

## 2024-03-11 NOTE — Clinical Note
[id number]  [id number]

## 2024-03-11 NOTE — H&P
History Of Present Illness  Celeste Dave is a 63 y.o. female presenting with abnormal stress test. Patient reports she has held her eliquis since Wednesday evening. PMH includes cervical cancer, TIA, active tobacco abuse, hypertension, acute pulmonary embolism status post IVC filter in  on Eliquis, dyslipidemia, intolerance to multiple statins with severe myalgia, family history of premature coronary artery disease, history of moderate cardiomyopathy with ejection fraction 35 to 40% 2018, recovery of ejection fraction to 55% on echo performed 2018, severely elevated coronary calcium score 574 predominantly involving the LAD and RCA consistent with the degree of coronary artery disease on CT calcium score performed 2023, concern for inferior wall ischemia on pharmacologic nuclear stress test.      Past Medical History  She has a past medical history of Candidiasis, unspecified (2015), Other abnormal and inconclusive findings on diagnostic imaging of breast, Personal history of other diseases of the nervous system and sense organs (2015), Personal history of pulmonary embolism, and Stroke (CMS/[facility]).    Surgical History  She has a past surgical history that includes Cholecystectomy (2018); Total abdominal hysterectomy w/ bilateral salpingoophorectomy (2018); Other surgical history (2018);  section, classic (2018); Exploratory laparotomy (2018); Breast biopsy (Left, 2018); and CT angio coronary art with heartflow if score >30% (2018).     Social History  She reports that she has been smoking cigarettes. She has never used smokeless tobacco. No history on file for alcohol use and drug use.    Family History  Family History   Problem Relation Name Age of Onset    Breast cancer Mother          Allergies  Atorvastatin, Moxifloxacin, and Pravastatin    Home Medications  No current facility-administered medications on file prior to encounter.      Current Outpatient Medications on File Prior to Encounter   Medication Sig Dispense Refill    amLODIPine (Norvasc) 2.5 mg tablet Take 1 tablet (2.5 mg) by mouth once daily. 90 tablet 1    apixaban (Eliquis) 5 mg tablet Take 1 tablet (5 mg) by mouth 2 times a day. 180 tablet 1    evolocumab with infusor (Repatha Pushtronex) 420 mg/3.5 mL injection Inject 3.5 mL (420 mg) under the skin every 28 (twenty-eight) days. 3.5 mL 11    levothyroxine (Synthroid) 112 mcg tablet Take 1 tablet (112 mcg) by mouth once daily. 90 tablet 1          Inpatient Medications:  Scheduled medications   Medication Dose Route Frequency    aspirin  324 mg oral Once     PRN medications   Medication     Continuous Medications   Medication Dose Last Rate    sodium chloride 0.9%  100 mL/hr 100 mL/hr (03/11/24 1215)         Review of Systems   Constitutional: Negative.    HENT: Negative.     Eyes: Negative.    Respiratory: Negative.     Cardiovascular: Negative.    Gastrointestinal: Negative.    Endocrine: Negative.    Genitourinary: Negative.    Musculoskeletal: Negative.    Skin: Negative.    Allergic/Immunologic: Negative.    Neurological: Negative.    Hematological: Negative.    Psychiatric/Behavioral: Negative.            Physical Exam  Constitutional:       General: She is awake. She is not in acute distress.     Appearance: She is not ill-appearing or toxic-appearing.   HENT:      Nose: Nose normal.      Mouth/Throat:      Mouth: Mucous membranes are moist.      Pharynx: Oropharynx is clear.   Eyes:      Extraocular Movements: Extraocular movements intact.      Conjunctiva/sclera: Conjunctivae normal.   Cardiovascular:      Rate and Rhythm: Normal rate and regular rhythm.      Pulses: Normal pulses.           Radial pulses are 2+ on the right side and 2+ on the left side.        Dorsalis pedis pulses are 2+ on the right side and 2+ on the left side.      Heart sounds: Normal heart sounds. No murmur heard.  Pulmonary:      Effort:  "Pulmonary effort is normal.      Breath sounds: Normal breath sounds and air entry.   Abdominal:      General: Bowel sounds are normal. There is no distension.      Palpations: Abdomen is soft.      Tenderness: There is no abdominal tenderness.   Musculoskeletal:      Cervical back: Normal range of motion and neck supple.      Right lower leg: No edema.      Left lower leg: No edema.   Skin:     General: Skin is warm and dry.   Neurological:      General: No focal deficit present.      Mental Status: She is alert and oriented to person, place, and time. Mental status is at baseline.      GCS: GCS eye subscore is 4. GCS verbal subscore is 5. GCS motor subscore is 6.   Psychiatric:         Mood and Affect: Mood normal.         Behavior: Behavior normal. Behavior is cooperative.         Thought Content: Thought content normal.         Judgment: Judgment normal.        Sedation Plan    ASA 3     Mallampati class: II.         Last Recorded Vitals  Blood pressure 153/75, pulse 55, temperature 36.7 °C (98.1 °F), temperature source Temporal, resp. rate 18, height 1.626 m (5' 4\"), weight 92.1 kg (203 lb), SpO2 98 %.         Vitals from the Past 24 Hours  Heart Rate:  [55]   Temp:  [36.7 °C (98.1 °F)]   Resp:  [18]   BP: (153)/(75)   Height:  [162.6 cm (5' 4\")]   Weight:  [92.1 kg (203 lb)]   SpO2:  [98 %]          Relevant Results    Labs    CBC:   Recent Labs     05/30/23  0922 11/03/22  0956 10/29/21  1014 05/02/21  1656 01/08/21  1702 06/26/20  1114   WBC 8.3 8.4 7.6 7.7 5.9 5.8   HGB 13.0 13.9 14.3 14.2 14.0 13.2   HCT 41.1 44.2 47.7* 44.1 43.0 41.3    407 414 309 355 288   MCV 99 101* 106* 102* 108* 114*     BMP/CMP:   Recent Labs     01/02/24  1058 05/30/23  0922 11/03/22  0956 10/29/21  1014 05/02/21  1656 01/08/21  1702    139 141 141 139 140   K 4.3 4.0 4.1 4.2 3.7 4.7    108* 108* 108* 107 109*   BUN 10 11 8 15 5* 9   CREATININE 0.72 0.71 0.82 0.75 0.72 0.72   CO2 28 27 22 26 21 26   CALCIUM 9.7 " "9.9 9.3 10.0 9.2 9.7   PROT 6.8 7.0 7.2 7.2 6.9 7.0   BILITOT 0.3 0.3 0.2 0.3 0.4 0.3   ALKPHOS 67 75 77 81 66 58   ALT 15 12 12 16 11 14   AST 22 19 16 21 17 16   GLUCOSE 84 108* 74 85 96 82      Lipid Panel:   Recent Labs     01/02/24  1058 05/30/23  0922 11/03/22  0956 10/29/21  1014 01/08/21  1702 06/26/20  1114 01/23/20  0914 04/22/19  1148 06/29/18 2015 06/29/18  1600   CHOL 217* 249* 268* 317* 264* 245* 287* 244* 192 205*   HDL 72.5 62.4 64.9 74.4 80.1 77.4 68.2 72.8 85.2 93.2   CHHDL 3.0 4.0 4.1 4.3 3.3 3.2 4.2 3.4 2.3 2.2   VLDL 13 11 24 35 18 18 17 23 14 12   TRIG 64 57 120 177* 90 88 84 114 69 60   NHDL 145  --   --   --   --   --   --   --   --   --      Cardiac       No lab exists for component: \"CK\", \"CKMBP\"   Hemoglobin A1C:   Recent Labs     06/29/18 2015   HGBA1C 5.3     TSH/ Free T4:   Recent Labs     01/02/24  1058 05/30/23  0922 11/03/22  0956 10/29/21  1014 05/04/21  1210 01/08/21  1702 06/26/20  1114 01/23/20  0914 04/22/19  1148 06/29/18  1600 03/19/18  1122   TSH 3.73 0.81 3.32 8.34* 3.24 8.14* 3.46 4.04* 2.91 0.91 0.73   FREET4  --   --   --  0.93  --  0.94  --   --   --   --   --      Iron:   Recent Labs     06/30/18  0540   *     Coag:     ABO: No results found for: \"ABO\"    Past Cardiology Tests (Last 3 Years):  EKG:  No results found for this or any previous visit (from the past 4464 hour(s)).  Echo:  Results for orders placed in visit on 02/29/24    Transthoracic echo (TTE) complete    [facility], [address] [place] [address]  Tel [phone] and Fax [phone]    TRANSTHORACIC ECHOCARDIOGRAM REPORT      Patient Name:      RENEE GRAHAM       Reading Physician:    Sofia Raza MD  Study Date:        2/29/2024            Ordering Provider:    Sofia RAZA  MRN/PID:           [id number]             Fellow:  Accession#:        [id number]         Nurse:                Nadine Quiroga RN  Date of Birth/Age: 1960 / 63      " Sonographer:          Mervat jennings                                      RDCS  Gender:            F                    Additional Staff:     Elvi Garcia RN  Height:            162.56 cm            Admit Date:  Weight:            92.08 kg             Admission Status:     Outpatient  BSA / BMI:         1.97 m2 / 34.84      Encounter#:           [id number]  kg/m2  Department Location:  [facility] Non  Invasive  Blood Pressure: 154 /89 mmHg    Study Type:    TRANSTHORACIC ECHO (TTE) COMPLETE  Diagnosis/ICD: Other forms of dyspnea-R06.09; Other cardiomyopathies-I42.8  Indication:    Dyspnea on Exertion, Congestive Heart Failure  CPT Code:      Echo Complete w Full Doppler-[id number]    Patient History:  MI Location/Type:  Non-ST Elevation MI  Pertinent History: Previous DVT, HTN, Hyperlipidemia, COPD and Dyspnea. Atypical  CP, Hypothyroid, Grave's Disease.    Study Detail: The following Echo studies were performed: M-Mode, 2D, color flow  and Doppler. Image quality for this study is less than ideal.  Technically challenging study due to prominent lung artifact and  body habitus.      PHYSICIAN INTERPRETATION:  Left Ventricle: The left ventricular systolic function is normal, with an estimated ejection fraction of 55-60%. There are no regional wall motion abnormalities. The left ventricular cavity size is normal. The left ventricular septal wall thickness is normal. There is normal left ventricular posterior wall thickness. Spectral Doppler shows an impaired relaxation pattern of left ventricular diastolic filling.  Left Atrium: The left atrium is normal in size.  Right Ventricle: The right ventricle is normal in size. There is normal right ventricular global systolic function.  Right Atrium: The right atrium is normal in size.  Aortic Valve: The aortic valve appears structurally normal. The aortic valve appears tricuspid. There is mild aortic valve cusp calcification. There is no evidence of aortic valve  regurgitation. The peak instantaneous gradient of the aortic valve is 9.6 mmHg.  Mitral Valve: The mitral valve is normal in structure. There is no evidence of mitral valve regurgitation.  Tricuspid Valve: The tricuspid valve is structurally normal. There is trace to mild tricuspid regurgitation.  Pulmonic Valve: The pulmonic valve is structurally normal. There is no indication of pulmonic valve regurgitation.  Pericardium: There is no pericardial effusion noted.  Aorta: The aortic root is normal.  Systemic Veins: The inferior vena cava appears to be of normal size. There is IVC inspiratory collapse greater than 50%.  In comparison to the previous echocardiogram(s): Compared with study from 7/28/2018, no significant change.      CONCLUSIONS:  1. Left ventricular systolic function is normal with a 55-60% estimated ejection fraction.  2. Spectral Doppler shows an impaired relaxation pattern of left ventricular diastolic filling.    QUANTITATIVE DATA SUMMARY:  2D MEASUREMENTS:  Normal Ranges:  Ao Root d:     3.40 cm   (2.0-3.7cm)  LAs:           2.89 cm   (2.7-4.0cm)  RVIDd:         2.40 cm   (0.9-3.6cm)  IVSd:          0.88 cm   (0.6-1.1cm)  LVPWd:         0.92 cm   (0.6-1.1cm)  LVIDd:         4.64 cm   (3.9-5.9cm)  LVIDs:         3.40 cm  LV Mass Index: 71.4 g/m2  LV % FS        26.7 %    LA VOLUME:  Normal Ranges:  LA Vol A4C:        50.8 ml    (22+/-6mL/m2)  LA Vol A2C:        55.5 ml  LA Vol BP:         53.1 ml  LA Vol Index A4C:  25.8 ml/m2  LA Vol Index A2C:  28.2 ml/m2  LA Vol Index BP:   27.0 ml/m2  LA Area A4C:       17.8 cm2  LA Area A2C:       18.6 cm2  LA Major Axis A4C: 5.3 cm  LA Major Axis A2C: 5.3 cm  LA Volume Index:   27.0 ml/m2  LA Vol A4C:        47.3 ml  LA Vol A2C:        52.5 ml    AORTA MEASUREMENTS:  Normal Ranges:  Asc Ao, d: 3.20 cm (2.1-3.4cm)    LV DIASTOLIC FUNCTION:  Normal Ranges:  MV Peak E:        0.79 m/s    (0.7-1.2 m/s)  MV Peak A:        0.70 m/s    (0.42-0.7 m/s)  E/A Ratio:       "  1.12        (1.0-2.2)  MV e'             0.09 m/s    (>8.0)  MV A Dur:         102.76 msec  E/e' Ratio:       8.73        (<8.0)  PulmV Sys Nadeem:    59.75 cm/s  PulmV Pierce Nadeem:   45.05 cm/s  PulmV S/D Nadeem:    1.33  PulmV A Revs Nadeem: 19.26 cm/s  PulmV A Revs Dur: 121.79 msec    MITRAL VALVE:  Normal Ranges:  MV DT: 204 msec (150-240msec)    AORTIC VALVE:  Normal Ranges:  AoV Vmax:      1.55 m/s (<=1.7m/s)  AoV Peak P.6 mmHg (<20mmHg)  LVOT Max Nadeem:  1.14 m/s (<=1.1m/s)  LVOT VTI:      22.61 cm  LVOT Diameter: 2.05 cm  (1.8-2.4cm)  AoV Area,Vmax: 2.43 cm2 (2.5-4.5cm2)      RIGHT VENTRICLE:  RV Basal 5.00 cm  RV Mid   2.40 cm  RV Major 8.9 cm  TAPSE:   21.0 mm  RV s'    0.10 m/s    TRICUSPID VALVE/RVSP:  Normal Ranges:  IVC Diam: 1.90 cm    PULMONIC VALVE:  Normal Ranges:  PV Max Nadeem: 0.7 m/s  (0.6-0.9m/s)  PV Max P.9 mmHg    Pulmonary Veins:  PulmV A Revs Dur: 121.79 msec  PulmV A Revs Nadeem: 19.26 cm/s  PulmV Pierce Nadeem:   45.05 cm/s  PulmV S/D Nadeem:    1.33  PulmV Sys Nadeem:    59.75 cm/s    AORTA:  Asc Ao Diam 3.23 cm      [id number] Jt Torres MD  Electronically signed on 2024 at 4:54:07 PM        ** Final **    Ejection Fractions:  No results found for: \"EF\"  Cath:  No results found for this or any previous visit.    Stress Test:  Results for orders placed during the hospital encounter of 24    Nuclear Stress Test    Narrative  Interpreted By:  Jt Torres,  STUDY:  NUCLEAR STRESS TEST;  2024 3:06 pm    INDICATION:  Signs/Symptoms:[place]/ CAD.    COMPARISON:  None.    23:  LM 0,  .18,  LCx 1.14,  .92,  Total   574.24    2018:  1. Normal coronary anatomy with right dominant system.  2. Multiple scattered calcified and noncalcified plaques in the RCA  with the most severe area of stenosis located in the mid RCA causing  between 50-70% stenosis. CT FFR analysis demonstrate slight drop in  the value below normal range (0.78) in the distal RCA.  3. Additional areas of scattered " calcified plaques in the LAD and LCX  causing no significant stenosis.    ACCESSION NUMBER(S):  [id number]    ORDERING CLINICIAN:  VALDO RAZA    TECHNIQUE:  DIVISION OF NUCLEAR MEDICINE  PHARMACOLOGIC STRESS MYOCARDIAL PERFUSION SCAN, ONE DAY PROTOCOL    The patient received an intravenous dose of  11 mCi of Tc-99m  Myoview and resting emission tomographic (SPECT) images of the  myocardium were acquired. The patient then received an intravenous  infusion of 0.4 mg regadenoson (Lexiscan) followed by an additional  dose of 33.4 mCi of Tc-99m  Myoview. Stress phase SPECT images of the  myocardium were then acquired. These included ECG-gated images to  assess and quantify ventricular function.  A low-dose, non-diagnostic  regional CT was utilized for attenuation correction purposes.    FINDINGS:  Rest images demonstrate a normal distribution of perfusion throughout  all LV segments except for mildly reduced distal anterior perfusion  rest with normal wall motion most consistent breast attenuation.  Stress images demonstrate normal distribution of perfusion throughout  all segments except for moderately reduced mid inferior wall  perfusion at stress concerning for ischemia. Severe GI uptake is  noted at rest. Mild to moderate GI uptake is noted at stress.    TID: 0.99    ECG-gated images demonstrate normal LV size (EDV 83 ml) and  myocardial contractility with an LV ejection fraction of 58 % (normal  above 45 percent).    Impression  1. Abnormal stress myocardial perfusion imaging in response to  pharmacologic stress concerning for possible inferior wall ischemia.  2. Well-maintained left ventricular function.      MACRO:  None    Signed by: Valdo Raza 2/6/2024 4:49 PM  Dictation workstation:   [id number]    Cardiac Imaging:  Results for orders placed in visit on 06/30/18    CT ANGIO HEART CORONARY    Narrative  MRN: [id number]  Patient Name: RENEE GRAHAM    STUDY:   CTA CORONARY ART WITH HEARTFLOW IF SCORE >30%.;  7/2/2018 1:43 pm    INDICATION:  Signs/Symptoms: CM, mild troponin elevaton.    COMPARISON:  None.    ACCESSION NUMBER(S):  [id number]    ORDERING CLINICIAN:  SHARIFA DUNN    TECHNIQUE:  Using multi-detector CT technology, axial, sequential imaging with  prospective gating was performed of the chest following the  intravenous administration of contrast material. Due to motion spiral  imaging with retrospective ECG gating was also performed. A  low-osmolar contrast agent was used (140 cc of Isovue 370). In  addition, CT-FFR analysis was also performed.    The patient was premedicated with 5 mg i.v metoprolol and 0.8 mg  sublingual nitroglycerin for heart rate control and coronary  dilation, respectively.    For optimization of anatomic evaluation, multiplanar reconstruction,  maximum intensity projections, and advanced 3-D off-line  postprocessing were performed on a dedicated stand-alone workstation  under the direct supervision of the interpreting physician.    CT Dose-Length Product (DLP):  568.9 mGy/cm  CT Dose Reduction Employed: Yes (Prospective triggering, iterative  reconstruction)    FINDINGS:  POTENTIAL STUDY LIMITATIONS: None.    CORONARY ARTERIES:    CORONARY ANATOMY:  There is normal origin of the coronary arteries.    LEFT MAIN CORONARY ARTERY:  The left main is normal sized vessel that bifurcates into the LAD and  circumflex.  There is a calcified plaque in the distal portion of the left main  coronary artery extending into the proximal LAD and proximal LCX  causing less than 30% stenosis.    LEFT ANTERIOR DESCENDING ARTERY:  The LAD is a normal size vessel that wraps around the apex.  It gives rise to 2 acute diagonal branches.  There are scattered calcified plaques in the proximal and mid LAD  causing less than 50% stenosis.    LEFT CIRCUMFLEX ARTERY:  The LCX is a normal size vessel, which is non-dominant.  It gives rise to ramila and 1 obtuse marginal branches.  Minimal calcification in the  proximal left circumflex artery causing  less than 30% stenosis.    RAMUS INTERMEDIUS:  The ramus is a normal sized vessel.  It gives rise to  obtuse marginal/diagonal branches.  There is no significant atherosclerotic change or stenotic disease.    RIGHT CORONARY ARTERY:  The RCA is a normal size vessel, which is dominant .  It gives rise to a conus branch, and 2 acute marginal branches.  In  its distal segment it bifurcates into the PDA and PV branch.  Multiple calcified and noncalcified plaques are identified in the  proximal, mid and distal RCA with the most prominent area of disease  in the mid RCA caused by mixed calcified and noncalcified plaque  causing 50-70% stenosis.    CARDIAC CHAMBERS:  The cardiac chambers demonstrate normal atrioventricular and  ventriculoarterial concordance, and systemic and pulmonary venous  return.    LEFT ATRIUM:  Normal size (20-cm2)    RIGHT ATRIUM:  Normal size (20-cm2)    INTERATRIAL SEPTUM:  Intact.    LEFT VENTRICLE:  Normal size (5.0-cm)    RIGHT VENTRICLE:  Normal size (3.9-cm)    AORTIC VALVE:  The aortic valve is trileaflet in morphology. No calcifications.    MITRAL VALVE:  No thickening/calcification.    Air is identified in the right atrium and right atrial appendage as  well as in the right ventricle, likely due to injection.    THORACIC AORTA:  The visualized thoracic aorta is normal in course, caliber, and  contour.  There is no acute aortic pathology, such as dissection, intramural  hematoma, or contained rupture.  The aortic arch is not included on this examination.    PERICARDIUM:  There is no pericardial effusion of thickening.    CHEST:  The chest wall is normal.  No significant lymphadenopathy or mass is seen in limited images of  the mediastinum.  Limited imaging through the lungs reveals no gross abnormalities.  Atelectatic changes are identified in the dependent portion the lungs.  No pleural effusion or pneumothorax.    UPPER ABDOMEN:  Limited imaging  through the upper abdomen reveals no abnormalities of  the visualized organs.    Impression  1. Normal coronary anatomy with right dominant system.  2. Multiple scattered calcified and noncalcified plaques in the RCA  with the most severe area of stenosis located in the mid RCA causing  between 50-70% stenosis. CT FFR analysis demonstrate slight drop in  the value below normal range (0.78) in the distal RCA.  3. Additional areas of scattered calcified plaques in the LAD and LCX  causing no significant stenosis.      === 07/14/16 ===    MR LUMBAR SPINE WO IV CONTRAST  === 02/19/24 ===    CT LUNG SCREENING LOW DOSE    - Impression -  1. There are no suspicious parenchymal lung nodules  2. Stable appearance of left upper lobe subpleural ground-glass  opacities most likely postinfectious/inflammatory.  3. Recommend continued 1 year low-dose chest CT for surveillance.  4. Severe coronary artery calcifications and correlate with coronary  artery disease risk factors.      LUNG RADS CATEGORY:  Lung Rad: Lung-RADS 2, S (Benign Appearance or Indolent Behavior)    Recommendation: Continue annual screening with Low Dose Chest CT in  12 months, recommended as per American College of Radiology  Guidelines Lung-RADS Version 2022. Lung-RADS S (Other non-nodular  findings) Management as appropriate to finding per American College  of Radiology Guidelines Lung-RADS Version 2022.      MACRO:  None    Signed by: Renny Lafleur 2/20/2024 7:11 AM  Dictation workstation:   [id number]     Assessment/Plan  Assessment/Plan   Principal Problem:    Abnormal stress test  Active Problems:    Atypical chest pain  -C with Dr. Torres on 3/11/24  -asa 324mg po once prior to procedure       I spent 30 minutes in the professional and overall care of this patient.      Melchor Hayes, APRN-CNP, DNP

## 2024-03-13 ENCOUNTER — OFFICE VISIT (OUTPATIENT)
Dept: CARDIOLOGY | Facility: CLINIC | Age: 64
End: 2024-03-13
Payer: COMMERCIAL

## 2024-03-13 VITALS
OXYGEN SATURATION: 98 % | HEART RATE: 56 BPM | BODY MASS INDEX: 34.15 KG/M2 | HEIGHT: 64 IN | WEIGHT: 200 LBS | DIASTOLIC BLOOD PRESSURE: 64 MMHG | SYSTOLIC BLOOD PRESSURE: 110 MMHG

## 2024-03-13 DIAGNOSIS — R93.1 ABNORMAL FINDINGS ON DIAGNOSTIC IMAGING OF HEART AND CORONARY CIRCULATION: ICD-10-CM

## 2024-03-13 DIAGNOSIS — R94.39 ABNORMAL STRESS TEST: ICD-10-CM

## 2024-03-13 DIAGNOSIS — R06.09 DOE (DYSPNEA ON EXERTION): ICD-10-CM

## 2024-03-13 DIAGNOSIS — R07.89 ATYPICAL CHEST PAIN: Primary | ICD-10-CM

## 2024-03-13 DIAGNOSIS — I25.10 CORONARY ARTERY DISEASE INVOLVING NATIVE CORONARY ARTERY OF NATIVE HEART WITHOUT ANGINA PECTORIS: ICD-10-CM

## 2024-03-13 DIAGNOSIS — I50.33 ACUTE ON CHRONIC DIASTOLIC HEART FAILURE (MULTI): ICD-10-CM

## 2024-03-13 PROCEDURE — 3078F DIAST BP <80 MM HG: CPT | Performed by: STUDENT IN AN ORGANIZED HEALTH CARE EDUCATION/TRAINING PROGRAM

## 2024-03-13 PROCEDURE — 3074F SYST BP LT 130 MM HG: CPT | Performed by: STUDENT IN AN ORGANIZED HEALTH CARE EDUCATION/TRAINING PROGRAM

## 2024-03-13 PROCEDURE — 99214 OFFICE O/P EST MOD 30 MIN: CPT | Performed by: STUDENT IN AN ORGANIZED HEALTH CARE EDUCATION/TRAINING PROGRAM

## 2024-03-13 PROCEDURE — 3008F BODY MASS INDEX DOCD: CPT | Performed by: STUDENT IN AN ORGANIZED HEALTH CARE EDUCATION/TRAINING PROGRAM

## 2024-03-13 NOTE — PATIENT INSTRUCTIONS
Please continue current cardiac medications including Eliquis 5 mg twice daily, amlodipine 2.5 mg daily, Repatha.    Please followup with me in Cardiology clinic within the next 5 to 6 months.  Please return to clinic sooner or seek emergent care if your symptoms reoccur or worsen.

## 2024-03-13 NOTE — PROGRESS NOTES
Follow-up (Cardiac catheterization results)     HPI:    Celeste Dave is a 63 y.o. female with pertinent history of cervical cancer, TIA, active tobacco abuse, hypertension, acute pulmonary embolism status post IVC filter in 2021 on Eliquis, dyslipidemia, intolerance to multiple statins with severe myalgia, family history of premature coronary artery disease, history of moderate cardiomyopathy with ejection fraction 35 to 40% 6/30/2018, recovery of ejection fraction to 55% on echo performed 7/2/2018, severely elevated coronary calcium score 574 predominantly involving the LAD and RCA consistent with the degree of coronary artery disease on CT calcium score performed 5/12/2023, concern for inferior wall ischemia on pharmacologic nuclear stress test, mild to moderate nonobstructive coronary artery disease with elevated LVEDP of 14 to 15 mmHg consistent with degree of acute on chronic diastolic heart failure and catheterization performed 3/11/2024, preserved ejection fraction 55 to 60% with impaired relaxation echo performed 2/29/2024 presents to cardiology clinic for follow-up.    She is relatively stable.  Dyspnea exertion is stable.  She continues to have some discomfort that she describes as indigestion.  We have prolonged discussion about her recent echo and catheterization results.  No exacerbating or relieving factors.  Pt denies orthopnea, and paroxysmal nocturnal dyspnea.  Pt denies worsening lower extremity edema.  Pt denies palpitations or syncope.  No recent falls.  No fever or chills.  No cough.  No change in bowel or bladder habits.  No sick contacts.  No recent travel.    12 point review of systems including (Constitutional, Eyes, ENMT, Respiratory, Cardiac, Gastrointestinal, Neurological, Psychiatric, and Hematologic) was performed and is otherwise negative.    Past medical history reviewed:   has a past medical history of Candidiasis, unspecified (03/26/2015), Other abnormal and inconclusive findings  on diagnostic imaging of breast, Personal history of other diseases of the nervous system and sense organs (2015), Personal history of pulmonary embolism, and Stroke (CMS/HCC).    Past surgical history reviewed:   has a past surgical history that includes Cholecystectomy (2018); Total abdominal hysterectomy w/ bilateral salpingoophorectomy (2018); Other surgical history (2018);  section, classic (2018); Exploratory laparotomy (2018); Breast biopsy (Left, 2018); CT angio coronary art with heartflow if score >30% (2018); and Cardiac catheterization (N/A, 3/11/2024).    Social history reviewed:   reports that she has been smoking cigarettes. She has never used smokeless tobacco. No history on file for alcohol use and drug use.     Family history reviewed: maternal uncle with MI at 50.  Maternal grandmother CAD    Allergies reviewed: Atorvastatin, Moxifloxacin, and Pravastatin     Medications reviewed:   Current Outpatient Medications   Medication Instructions    amLODIPine (NORVASC) 2.5 mg, oral, Daily    apixaban (ELIQUIS) 5 mg, oral, 2 times daily, PLEASE HOLD. OK TO RESUME ON 3/12/24 NIGHT    evolocumab with infusor (REPATHA PUSHTRONEX) 420 mg, subcutaneous, Every 28 days    ezetimibe (ZETIA) 10 mg, oral, Daily    levothyroxine (SYNTHROID) 112 mcg, oral, Daily    traZODone (DESYREL) 100 mg, oral, Nightly PRN        Vitals reviewed: Visit Vitals  /64   Pulse 56       Physical Exam:   General:  Patient is awake, alert, and oriented.  Patient is in no acute distress.  HEENT:  Pupils equal and reactive.  Normocephalic.  Moist mucosa.    Neck:  No thyromegaly.  Normal Jugular Venous Pressure.  Cardiovascular:  Regular rate and rhythm.  Normal S1 and S2.  1/6 REDD.  Pulmonary:  Clear to auscultation bilaterally.  Abdomen:  Soft. Non-tender.   Non-distended.  Positive bowel sounds.  Lower Extremities:  2+ pedal pulses. No LE edema.  Neurologic:  Cranial nerves  intact.  No focal deficit.   Skin: Skin warm and dry, normal skin turgor.   Psychiatric: Normal affect.    Last Labs:  CBC -      Lab Results   Component Value Date    WBC 6.0 03/11/2024    HGB 13.7 03/11/2024    HCT 42.2 03/11/2024     03/11/2024        CMP-  Lab Results   Component Value Date    GLUCOSE 83 03/11/2024     03/11/2024    K 4.4 03/11/2024     03/11/2024    CO2 28 03/11/2024    ANIONGAP 10 03/11/2024    BUN 9 03/11/2024    CREATININE 0.71 03/11/2024    EGFR >90 03/11/2024    CALCIUM 8.7 03/11/2024    PROT 6.8 01/02/2024    ALBUMIN 4.1 01/02/2024    AST 22 01/02/2024    ALT 15 01/02/2024    ALKPHOS 67 01/02/2024    BILITOT 0.3 01/02/2024        LIPIDS-  Lab Results   Component Value Date    CHOL 217 (H) 01/02/2024    TRIG 64 01/02/2024    HDL 72.5 01/02/2024    CHHDL 3.0 01/02/2024    VLDL 13 01/02/2024        OTHERS-  Lab Results   Component Value Date    HGBA1C 5.3 06/29/2018     (H) 06/30/2018        I personally reviewed the patient's recent vitals, labs, medications, orders, EKGs, pertinent cardiac imaging/ echocardiography and ischemic evaluations including stress testing/ cardiac catheterization.    Assessment and Plan:    Celeste Dave is a 63 y.o. female with pertinent history of cervical cancer, TIA, active tobacco abuse, hypertension, acute pulmonary embolism status post IVC filter in 2021 on Eliquis, dyslipidemia, intolerance to multiple statins with severe myalgia, family history of premature coronary artery disease, history of moderate cardiomyopathy with ejection fraction 35 to 40% 6/30/2018, recovery of ejection fraction to 55% on echo performed 7/2/2018, severely elevated coronary calcium score 574 predominantly involving the LAD and RCA consistent with the degree of coronary artery disease on CT calcium score performed 5/12/2023, concern for inferior wall ischemia on pharmacologic nuclear stress test, mild to moderate nonobstructive coronary artery disease  with elevated LVEDP of 14 to 15 mmHg consistent with degree of acute on chronic diastolic heart failure and catheterization performed 3/11/2024, preserved ejection fraction 55 to 60% with impaired relaxation echo performed 2/29/2024 presents to cardiology clinic for follow-up.  She is relatively stable.  Dyspnea exertion is stable.  She continues to have some discomfort that she describes as indigestion.  We have prolonged discussion about her recent echo and catheterization results.      Please continue current cardiac medications including Eliquis 5 mg twice daily, amlodipine 2.5 mg daily, Repatha.    Please followup with me in Cardiology clinic within the next 5 to 6 months.  Please return to clinic sooner or seek emergent care if your symptoms reoccur or worsen.    Thank you for allowing me to participate in their care.  Please feel free to call me with any further questions or concerns.        Jt Torres MD, FACC, MAURICIO MARRERO  Division of Cardiovascular Medicine  Medical Director, [facility]  Clinical , [employer]  [email or web address]   Office:  [phone]

## 2024-04-29 ENCOUNTER — TELEPHONE (OUTPATIENT)
Dept: PRIMARY CARE | Facility: CLINIC | Age: 64
End: 2024-04-29

## 2024-04-29 DIAGNOSIS — I48.11 LONGSTANDING PERSISTENT ATRIAL FIBRILLATION (MULTI): ICD-10-CM

## 2024-05-20 DIAGNOSIS — E05.00 GRAVES DISEASE: ICD-10-CM

## 2024-05-20 RX ORDER — LEVOTHYROXINE SODIUM 112 UG/1
112 TABLET ORAL DAILY
Qty: 90 TABLET | Refills: 1 | Status: SHIPPED | OUTPATIENT
Start: 2024-05-20 | End: 2024-11-16

## 2024-07-08 ENCOUNTER — TELEPHONE (OUTPATIENT)
Dept: PRIMARY CARE | Facility: CLINIC | Age: 64
End: 2024-07-08

## 2024-07-08 ENCOUNTER — APPOINTMENT (OUTPATIENT)
Dept: PRIMARY CARE | Facility: CLINIC | Age: 64
End: 2024-07-08
Payer: COMMERCIAL

## 2024-07-08 VITALS
BODY MASS INDEX: 35.34 KG/M2 | HEART RATE: 80 BPM | DIASTOLIC BLOOD PRESSURE: 70 MMHG | HEIGHT: 64 IN | WEIGHT: 207 LBS | SYSTOLIC BLOOD PRESSURE: 118 MMHG

## 2024-07-08 DIAGNOSIS — E66.01 CLASS 2 SEVERE OBESITY DUE TO EXCESS CALORIES WITH SERIOUS COMORBIDITY AND BODY MASS INDEX (BMI) OF 35.0 TO 35.9 IN ADULT (MULTI): ICD-10-CM

## 2024-07-08 DIAGNOSIS — J44.9 CHRONIC OBSTRUCTIVE PULMONARY DISEASE, UNSPECIFIED COPD TYPE (MULTI): ICD-10-CM

## 2024-07-08 DIAGNOSIS — F17.210 CIGARETTE NICOTINE DEPENDENCE WITHOUT COMPLICATION: ICD-10-CM

## 2024-07-08 DIAGNOSIS — M12.9 ARTHROPATHY: ICD-10-CM

## 2024-07-08 DIAGNOSIS — I48.11 LONGSTANDING PERSISTENT ATRIAL FIBRILLATION (MULTI): ICD-10-CM

## 2024-07-08 DIAGNOSIS — I82.5Y9 CHRONIC DEEP VEIN THROMBOSIS (DVT) OF PROXIMAL VEIN OF LOWER EXTREMITY, UNSPECIFIED LATERALITY (MULTI): ICD-10-CM

## 2024-07-08 DIAGNOSIS — F51.01 PRIMARY INSOMNIA: ICD-10-CM

## 2024-07-08 DIAGNOSIS — G43.009 MIGRAINE WITHOUT AURA AND WITHOUT STATUS MIGRAINOSUS, NOT INTRACTABLE: ICD-10-CM

## 2024-07-08 DIAGNOSIS — I15.9 SECONDARY HYPERTENSION: ICD-10-CM

## 2024-07-08 DIAGNOSIS — I27.82 OTHER CHRONIC PULMONARY EMBOLISM WITHOUT ACUTE COR PULMONALE (MULTI): ICD-10-CM

## 2024-07-08 DIAGNOSIS — E78.5 DYSLIPIDEMIA: ICD-10-CM

## 2024-07-08 DIAGNOSIS — E03.9 HYPOTHYROIDISM, UNSPECIFIED TYPE: ICD-10-CM

## 2024-07-08 DIAGNOSIS — I42.9 CARDIOMYOPATHY, UNSPECIFIED TYPE (MULTI): ICD-10-CM

## 2024-07-08 DIAGNOSIS — I10 BENIGN ESSENTIAL HYPERTENSION: Primary | ICD-10-CM

## 2024-07-08 DIAGNOSIS — E05.00 GRAVES DISEASE: ICD-10-CM

## 2024-07-08 PROBLEM — J01.90 ACUTE RHINOSINUSITIS: Status: ACTIVE | Noted: 2024-02-01

## 2024-07-08 PROBLEM — G24.8 FOCAL DYSTONIA: Status: ACTIVE | Noted: 2024-07-08

## 2024-07-08 PROBLEM — K63.9 COLON DISORDER: Status: ACTIVE | Noted: 2024-07-08

## 2024-07-08 PROBLEM — G45.9 TRANSIENT ISCHEMIC ATTACK: Status: ACTIVE | Noted: 2024-07-08

## 2024-07-08 PROCEDURE — 80053 COMPREHEN METABOLIC PANEL: CPT

## 2024-07-08 PROCEDURE — 3074F SYST BP LT 130 MM HG: CPT | Performed by: FAMILY MEDICINE

## 2024-07-08 PROCEDURE — 4004F PT TOBACCO SCREEN RCVD TLK: CPT | Performed by: FAMILY MEDICINE

## 2024-07-08 PROCEDURE — 36415 COLL VENOUS BLD VENIPUNCTURE: CPT

## 2024-07-08 PROCEDURE — 3078F DIAST BP <80 MM HG: CPT | Performed by: FAMILY MEDICINE

## 2024-07-08 PROCEDURE — 80061 LIPID PANEL: CPT

## 2024-07-08 PROCEDURE — 3008F BODY MASS INDEX DOCD: CPT | Performed by: FAMILY MEDICINE

## 2024-07-08 PROCEDURE — 99215 OFFICE O/P EST HI 40 MIN: CPT | Performed by: FAMILY MEDICINE

## 2024-07-08 PROCEDURE — 84443 ASSAY THYROID STIM HORMONE: CPT

## 2024-07-08 RX ORDER — AMLODIPINE BESYLATE 2.5 MG/1
2.5 TABLET ORAL DAILY
Qty: 90 TABLET | Refills: 1 | Status: SHIPPED | OUTPATIENT
Start: 2024-07-08 | End: 2024-07-08 | Stop reason: WASHOUT

## 2024-07-08 RX ORDER — TRAZODONE HYDROCHLORIDE 150 MG/1
150 TABLET ORAL NIGHTLY PRN
Qty: 90 TABLET | Refills: 1 | Status: SHIPPED | OUTPATIENT
Start: 2024-07-08 | End: 2025-01-04

## 2024-07-08 RX ORDER — LEVOTHYROXINE SODIUM 112 UG/1
112 TABLET ORAL DAILY
Qty: 90 TABLET | Refills: 1 | Status: SHIPPED | OUTPATIENT
Start: 2024-07-08 | End: 2025-01-04

## 2024-07-08 RX ORDER — TRAZODONE HYDROCHLORIDE 100 MG/1
100 TABLET ORAL NIGHTLY PRN
Qty: 90 TABLET | Refills: 1 | Status: SHIPPED | OUTPATIENT
Start: 2024-07-08 | End: 2024-07-08 | Stop reason: SDUPTHER

## 2024-07-08 RX ORDER — EZETIMIBE 10 MG/1
10 TABLET ORAL DAILY
Qty: 90 TABLET | Refills: 1 | Status: SHIPPED | OUTPATIENT
Start: 2024-07-08 | End: 2025-01-04

## 2024-07-08 RX ORDER — DILTIAZEM HYDROCHLORIDE 180 MG/1
180 CAPSULE, COATED, EXTENDED RELEASE ORAL DAILY
Qty: 30 CAPSULE | Refills: 11 | Status: SHIPPED | OUTPATIENT
Start: 2024-07-08 | End: 2025-07-08

## 2024-07-08 ASSESSMENT — ENCOUNTER SYMPTOMS
OCCASIONAL FEELINGS OF UNSTEADINESS: 1
ARTHRALGIAS: 1
DEPRESSION: 0
CARDIOVASCULAR NEGATIVE: 1
JOINT SWELLING: 1
GASTROINTESTINAL NEGATIVE: 1
CONSTITUTIONAL NEGATIVE: 1
LOSS OF SENSATION IN FEET: 1
HYPERACTIVE: 1
SLEEP DISTURBANCE: 1
RESPIRATORY NEGATIVE: 1
BACK PAIN: 1
NEUROLOGICAL NEGATIVE: 1

## 2024-07-08 NOTE — LETTER
To whom it may concern     My Patient Celeste Dave DOB 1960 has a dog that functions as a support animal for her anxiety and stress. I believe that she is benefiting from this animals presence in her daily life at home.  Please allow her to keep this animal in her residence.    Sincerely     Dr Donte Hannon  [facility] Primary Care Director [place]

## 2024-07-08 NOTE — PROGRESS NOTES
"Subjective   Patient ID: Celeste Dave is a 63 y.o. female who presents for No chief complaint on file..    HPI htn, chol, hypothyroid insomnia, copd    Review of Systems   Constitutional: Negative.    HENT: Negative.     Respiratory: Negative.     Cardiovascular: Negative.    Gastrointestinal: Negative.    Musculoskeletal:  Positive for arthralgias, back pain and joint swelling.   Neurological: Negative.    Psychiatric/Behavioral:  Positive for sleep disturbance. The patient is hyperactive.        Objective   /70   Pulse 80   Ht 1.626 m (5' 4\")   Wt 93.9 kg (207 lb)   BMI 35.53 kg/m²     Physical Exam  Vitals reviewed.   Constitutional:       Appearance: Normal appearance. She is normal weight.   Eyes:      Extraocular Movements: Extraocular movements intact.      Conjunctiva/sclera: Conjunctivae normal.      Pupils: Pupils are equal, round, and reactive to light.   Cardiovascular:      Rate and Rhythm: Normal rate and regular rhythm.      Pulses: Normal pulses.      Heart sounds: Normal heart sounds.   Pulmonary:      Effort: Pulmonary effort is normal.      Breath sounds: Normal breath sounds.   Abdominal:      General: Bowel sounds are normal.      Palpations: Abdomen is soft.   Musculoskeletal:         General: Normal range of motion.      Comments: Gerrralized arthopathy   Skin:     General: Skin is warm and dry.   Neurological:      General: No focal deficit present.      Mental Status: She is alert and oriented to person, place, and time. Mental status is at baseline.         Assessment/Plan   Problem List Items Addressed This Visit             ICD-10-CM    Arthropathy M12.9    Benign essential hypertension - Primary I10    Cardiomyopathy (Multi) I42.9    Relevant Medications    dilTIAZem CD (Cardizem CD) 180 mg 24 hr capsule    Chronic deep vein thrombosis (DVT) of proximal vein of lower extremity (Multi) I82.5Y9    Class 2 obesity due to excess calories in adult E66.09    COPD (chronic " obstructive pulmonary disease) (Multi) J44.9    Relevant Orders    Complete Pulmonary Function Test Pre/Post Bronchodialator (Spirometry Pre/Post/DLCO/Lung Volumes)    Dyslipidemia E78.5    Relevant Medications    ezetimibe (Zetia) 10 mg tablet    Other Relevant Orders    Lipid Panel    Graves disease E05.00    Relevant Medications    levothyroxine (Synthroid) 112 mcg tablet    Other Relevant Orders    TSH with reflex to Free T4 if abnormal    HTN (hypertension) I10    Relevant Medications    dilTIAZem CD (Cardizem CD) 180 mg 24 hr capsule    Other Relevant Orders    Comprehensive Metabolic Panel    Hypothyroidism E03.9    Insomnia G47.00    Relevant Medications    traZODone (Desyrel) 150 mg tablet    Nicotine dependence F17.200    Pulmonary embolus (Multi) I26.99     Other Visit Diagnoses         Codes    Longstanding persistent atrial fibrillation (Multi)     I48.11    Relevant Medications    apixaban (Eliquis) 5 mg tablet    dilTIAZem CD (Cardizem CD) 180 mg 24 hr capsule        Htn ucnotn will increase to cdardizem 180 and stop normvasc  Migraine ha start caridizem and fu  Chol stable ccc  Copd w hx of pe will get pft to asses lung function   Insomnia uncont will increase to 150 trazodone and fu  Hypothyroid w episodes of temp intolerance will check levleas and fu  Pt w anxiety discussed techniques of stress reduction and activity and will fu in 3mo for reassesment

## 2024-07-09 ENCOUNTER — TELEPHONE (OUTPATIENT)
Dept: PRIMARY CARE | Facility: CLINIC | Age: 64
End: 2024-07-09
Payer: COMMERCIAL

## 2024-07-09 LAB
ALBUMIN SERPL BCP-MCNC: 3.9 G/DL (ref 3.4–5)
ALP SERPL-CCNC: 75 U/L (ref 33–136)
ALT SERPL W P-5'-P-CCNC: 12 U/L (ref 7–45)
ANION GAP SERPL CALC-SCNC: 14 MMOL/L (ref 10–20)
AST SERPL W P-5'-P-CCNC: 19 U/L (ref 9–39)
BILIRUB SERPL-MCNC: 0.2 MG/DL (ref 0–1.2)
BUN SERPL-MCNC: 16 MG/DL (ref 6–23)
CALCIUM SERPL-MCNC: 9.4 MG/DL (ref 8.6–10.6)
CHLORIDE SERPL-SCNC: 105 MMOL/L (ref 98–107)
CHOLEST SERPL-MCNC: 223 MG/DL (ref 0–199)
CHOLESTEROL/HDL RATIO: 3
CO2 SERPL-SCNC: 26 MMOL/L (ref 21–32)
CREAT SERPL-MCNC: 0.82 MG/DL (ref 0.5–1.05)
EGFRCR SERPLBLD CKD-EPI 2021: 81 ML/MIN/1.73M*2
GLUCOSE SERPL-MCNC: 61 MG/DL (ref 74–99)
HDLC SERPL-MCNC: 73.4 MG/DL
LDLC SERPL CALC-MCNC: 135 MG/DL
NON HDL CHOLESTEROL: 150 MG/DL (ref 0–149)
POTASSIUM SERPL-SCNC: 4.6 MMOL/L (ref 3.5–5.3)
PROT SERPL-MCNC: 6.7 G/DL (ref 6.4–8.2)
SODIUM SERPL-SCNC: 140 MMOL/L (ref 136–145)
TRIGL SERPL-MCNC: 74 MG/DL (ref 0–149)
TSH SERPL-ACNC: 2.02 MIU/L (ref 0.44–3.98)
VLDL: 15 MG/DL (ref 0–40)

## 2024-07-11 ENCOUNTER — HOSPITAL ENCOUNTER (OUTPATIENT)
Dept: RESPIRATORY THERAPY | Facility: CLINIC | Age: 64
Discharge: HOME | End: 2024-07-11
Payer: COMMERCIAL

## 2024-07-11 DIAGNOSIS — J44.9 CHRONIC OBSTRUCTIVE PULMONARY DISEASE, UNSPECIFIED COPD TYPE (MULTI): ICD-10-CM

## 2024-07-11 PROCEDURE — 94060 EVALUATION OF WHEEZING: CPT

## 2024-07-11 PROCEDURE — 94727 GAS DIL/WSHOT DETER LNG VOL: CPT

## 2024-07-11 PROCEDURE — 94729 DIFFUSING CAPACITY: CPT

## 2024-08-14 ENCOUNTER — APPOINTMENT (OUTPATIENT)
Dept: CARDIOLOGY | Facility: CLINIC | Age: 64
End: 2024-08-14
Payer: COMMERCIAL

## 2024-10-08 ENCOUNTER — TELEPHONE (OUTPATIENT)
Dept: PRIMARY CARE | Facility: CLINIC | Age: 64
End: 2024-10-08
Payer: COMMERCIAL

## 2024-10-08 DIAGNOSIS — J44.9 CHRONIC OBSTRUCTIVE PULMONARY DISEASE, UNSPECIFIED COPD TYPE (MULTI): Primary | ICD-10-CM

## 2024-10-08 RX ORDER — ALBUTEROL SULFATE 90 UG/1
2 INHALANT RESPIRATORY (INHALATION) EVERY 4 HOURS PRN
Qty: 6.7 G | Refills: 0 | Status: SHIPPED | OUTPATIENT
Start: 2024-10-08 | End: 2025-10-08

## 2024-12-05 DIAGNOSIS — E05.00 GRAVES DISEASE: ICD-10-CM

## 2024-12-05 RX ORDER — LEVOTHYROXINE SODIUM 112 UG/1
112 TABLET ORAL DAILY
Qty: 90 TABLET | Refills: 0 | Status: SHIPPED | OUTPATIENT
Start: 2024-12-05 | End: 2025-06-03

## 2025-01-06 ENCOUNTER — APPOINTMENT (OUTPATIENT)
Dept: PRIMARY CARE | Facility: CLINIC | Age: 65
End: 2025-01-06
Payer: COMMERCIAL

## 2025-01-06 VITALS
HEIGHT: 64 IN | SYSTOLIC BLOOD PRESSURE: 113 MMHG | DIASTOLIC BLOOD PRESSURE: 85 MMHG | HEART RATE: 72 BPM | BODY MASS INDEX: 35.51 KG/M2 | WEIGHT: 208 LBS

## 2025-01-06 DIAGNOSIS — J44.9 CHRONIC OBSTRUCTIVE PULMONARY DISEASE, UNSPECIFIED COPD TYPE (MULTI): ICD-10-CM

## 2025-01-06 DIAGNOSIS — E53.8 B12 DEFICIENCY: ICD-10-CM

## 2025-01-06 DIAGNOSIS — I48.11 LONGSTANDING PERSISTENT ATRIAL FIBRILLATION (MULTI): ICD-10-CM

## 2025-01-06 DIAGNOSIS — F51.01 PRIMARY INSOMNIA: ICD-10-CM

## 2025-01-06 DIAGNOSIS — I82.5Y9 CHRONIC DEEP VEIN THROMBOSIS (DVT) OF PROXIMAL VEIN OF LOWER EXTREMITY, UNSPECIFIED LATERALITY (MULTI): ICD-10-CM

## 2025-01-06 DIAGNOSIS — I25.2 HISTORY OF NON-ST ELEVATION MYOCARDIAL INFARCTION (NSTEMI): ICD-10-CM

## 2025-01-06 DIAGNOSIS — I15.9 SECONDARY HYPERTENSION: ICD-10-CM

## 2025-01-06 DIAGNOSIS — F32.A DEPRESSION, UNSPECIFIED DEPRESSION TYPE: ICD-10-CM

## 2025-01-06 DIAGNOSIS — E78.5 DYSLIPIDEMIA: ICD-10-CM

## 2025-01-06 DIAGNOSIS — E05.00 GRAVES DISEASE: ICD-10-CM

## 2025-01-06 DIAGNOSIS — M25.559 ARTHRALGIA OF HIP, UNSPECIFIED LATERALITY: ICD-10-CM

## 2025-01-06 DIAGNOSIS — E55.9 VITAMIN D DEFICIENCY: ICD-10-CM

## 2025-01-06 DIAGNOSIS — E66.812 CLASS 2 SEVERE OBESITY DUE TO EXCESS CALORIES WITH SERIOUS COMORBIDITY AND BODY MASS INDEX (BMI) OF 35.0 TO 35.9 IN ADULT: ICD-10-CM

## 2025-01-06 DIAGNOSIS — E03.9 HYPOTHYROIDISM, UNSPECIFIED TYPE: Primary | ICD-10-CM

## 2025-01-06 DIAGNOSIS — Z12.31 SCREENING MAMMOGRAM, ENCOUNTER FOR: ICD-10-CM

## 2025-01-06 DIAGNOSIS — F17.210 CIGARETTE NICOTINE DEPENDENCE WITHOUT COMPLICATION: ICD-10-CM

## 2025-01-06 DIAGNOSIS — E66.01 CLASS 2 SEVERE OBESITY DUE TO EXCESS CALORIES WITH SERIOUS COMORBIDITY AND BODY MASS INDEX (BMI) OF 35.0 TO 35.9 IN ADULT: ICD-10-CM

## 2025-01-06 LAB
25(OH)D3 SERPL-MCNC: 38 NG/ML (ref 30–100)
BASOPHILS # BLD AUTO: 0.03 X10*3/UL (ref 0–0.1)
BASOPHILS NFR BLD AUTO: 0.4 %
EOSINOPHIL # BLD AUTO: 0.14 X10*3/UL (ref 0–0.7)
EOSINOPHIL NFR BLD AUTO: 2 %
ERYTHROCYTE [DISTWIDTH] IN BLOOD BY AUTOMATED COUNT: 14.6 % (ref 11.5–14.5)
HCT VFR BLD AUTO: 44.6 % (ref 36–46)
HGB BLD-MCNC: 14.1 G/DL (ref 12–16)
IMM GRANULOCYTES # BLD AUTO: 0.02 X10*3/UL (ref 0–0.7)
IMM GRANULOCYTES NFR BLD AUTO: 0.3 % (ref 0–0.9)
LYMPHOCYTES # BLD AUTO: 2.74 X10*3/UL (ref 1.2–4.8)
LYMPHOCYTES NFR BLD AUTO: 38.8 %
MCH RBC QN AUTO: 31.5 PG (ref 26–34)
MCHC RBC AUTO-ENTMCNC: 31.6 G/DL (ref 32–36)
MCV RBC AUTO: 100 FL (ref 80–100)
MONOCYTES # BLD AUTO: 0.52 X10*3/UL (ref 0.1–1)
MONOCYTES NFR BLD AUTO: 7.4 %
NEUTROPHILS # BLD AUTO: 3.61 X10*3/UL (ref 1.2–7.7)
NEUTROPHILS NFR BLD AUTO: 51.1 %
NRBC BLD-RTO: 0 /100 WBCS (ref 0–0)
PLATELET # BLD AUTO: 328 X10*3/UL (ref 150–450)
RBC # BLD AUTO: 4.47 X10*6/UL (ref 4–5.2)
TSH SERPL-ACNC: 3.69 MIU/L (ref 0.44–3.98)
VIT B12 SERPL-MCNC: 344 PG/ML (ref 211–911)
WBC # BLD AUTO: 7.1 X10*3/UL (ref 4.4–11.3)

## 2025-01-06 PROCEDURE — 80053 COMPREHEN METABOLIC PANEL: CPT

## 2025-01-06 PROCEDURE — 3074F SYST BP LT 130 MM HG: CPT | Performed by: FAMILY MEDICINE

## 2025-01-06 PROCEDURE — 80061 LIPID PANEL: CPT

## 2025-01-06 PROCEDURE — 99396 PREV VISIT EST AGE 40-64: CPT | Performed by: FAMILY MEDICINE

## 2025-01-06 PROCEDURE — 3008F BODY MASS INDEX DOCD: CPT | Performed by: FAMILY MEDICINE

## 2025-01-06 PROCEDURE — 82607 VITAMIN B-12: CPT

## 2025-01-06 PROCEDURE — 84443 ASSAY THYROID STIM HORMONE: CPT

## 2025-01-06 PROCEDURE — 85025 COMPLETE CBC W/AUTO DIFF WBC: CPT

## 2025-01-06 PROCEDURE — 82306 VITAMIN D 25 HYDROXY: CPT

## 2025-01-06 PROCEDURE — 3079F DIAST BP 80-89 MM HG: CPT | Performed by: FAMILY MEDICINE

## 2025-01-06 RX ORDER — DILTIAZEM HYDROCHLORIDE 180 MG/1
180 CAPSULE, COATED, EXTENDED RELEASE ORAL DAILY
Qty: 90 CAPSULE | Refills: 1 | Status: SHIPPED | OUTPATIENT
Start: 2025-01-06 | End: 2025-07-05

## 2025-01-06 RX ORDER — LEVOTHYROXINE SODIUM 112 UG/1
112 TABLET ORAL DAILY
Qty: 90 TABLET | Refills: 1 | Status: SHIPPED | OUTPATIENT
Start: 2025-01-06 | End: 2025-07-05

## 2025-01-06 RX ORDER — TRAZODONE HYDROCHLORIDE 150 MG/1
150 TABLET ORAL NIGHTLY PRN
Qty: 90 TABLET | Refills: 1 | Status: SHIPPED | OUTPATIENT
Start: 2025-01-06 | End: 2025-07-05

## 2025-01-06 RX ORDER — EZETIMIBE 10 MG/1
10 TABLET ORAL DAILY
Qty: 90 TABLET | Refills: 1 | Status: SHIPPED | OUTPATIENT
Start: 2025-01-06 | End: 2025-07-05

## 2025-01-06 ASSESSMENT — ENCOUNTER SYMPTOMS
OCCASIONAL FEELINGS OF UNSTEADINESS: 0
GASTROINTESTINAL NEGATIVE: 1
FATIGUE: 1
NERVOUS/ANXIOUS: 1
LOSS OF SENSATION IN FEET: 1
WHEEZING: 1
CARDIOVASCULAR NEGATIVE: 1
ARTHRALGIAS: 1
NEUROLOGICAL NEGATIVE: 1
DEPRESSION: 0

## 2025-01-06 NOTE — PROGRESS NOTES
"Subjective   Patient ID: Celeste Dave is a 64 y.o. female who presents for Annual Exam.    HPI well check , htn, cardiomyopathy , chol, copd , hx of dvt, fatiuq e    Review of Systems   Constitutional:  Positive for fatigue.   HENT: Negative.     Respiratory:  Positive for wheezing.    Cardiovascular: Negative.    Gastrointestinal: Negative.    Musculoskeletal:  Positive for arthralgias.   Neurological: Negative.    Psychiatric/Behavioral:  The patient is nervous/anxious.        Objective   /85   Pulse 72   Ht 1.626 m (5' 4\")   Wt 94.3 kg (208 lb)   BMI 35.70 kg/m²     Physical Exam  Vitals reviewed.   Constitutional:       Appearance: Normal appearance. She is normal weight.   Eyes:      Extraocular Movements: Extraocular movements intact.      Conjunctiva/sclera: Conjunctivae normal.      Pupils: Pupils are equal, round, and reactive to light.   Cardiovascular:      Rate and Rhythm: Normal rate and regular rhythm.      Pulses: Normal pulses.      Heart sounds: Normal heart sounds.   Pulmonary:      Effort: Pulmonary effort is normal.      Breath sounds: Normal breath sounds.   Abdominal:      General: Bowel sounds are normal.      Palpations: Abdomen is soft.   Musculoskeletal:         General: Normal range of motion.   Skin:     General: Skin is warm and dry.   Neurological:      General: No focal deficit present.      Mental Status: She is alert and oriented to person, place, and time. Mental status is at baseline.         Assessment/Plan   Problem List Items Addressed This Visit             ICD-10-CM    Arthralgia of hip M25.559    Class 2 obesity due to excess calories in adult E66.812, E66.09    COPD (chronic obstructive pulmonary disease) (Multi) J44.9    Relevant Orders    CT lung screening low dose    Deep vein thrombosis (DVT) (Multi) I82.409    Depression F32.A    Dyslipidemia E78.5    Relevant Medications    ezetimibe (Zetia) 10 mg tablet    Other Relevant Orders    Lipid Panel    Graves " disease E05.00    Relevant Medications    Synthroid 112 mcg tablet    History of non-ST elevation myocardial infarction (NSTEMI) I25.2    HTN (hypertension) I10    Relevant Medications    dilTIAZem CD (Cardizem CD) 180 mg 24 hr capsule    Other Relevant Orders    Comprehensive Metabolic Panel    Hypothyroidism - Primary E03.9    Relevant Orders    TSH with reflex to Free T4 if abnormal    Insomnia G47.00    Relevant Medications    traZODone (Desyrel) 150 mg tablet    Nicotine dependence F17.200    Vitamin D deficiency E55.9    Relevant Orders    Vitamin D 25-Hydroxy,Total (for eval of Vitamin D levels)     Other Visit Diagnoses         Codes    Longstanding persistent atrial fibrillation (Multi)     I48.11    Relevant Medications    apixaban (Eliquis) 5 mg tablet    dilTIAZem CD (Cardizem CD) 180 mg 24 hr capsule    Other Relevant Orders    CBC and Auto Differential    B12 deficiency     E53.8    Relevant Orders    Vitamin B12    Screening mammogram, encounter for     Z12.31    Relevant Orders    BI mammo bilateral screening tomosynthesis

## 2025-01-07 ENCOUNTER — TELEPHONE (OUTPATIENT)
Dept: PRIMARY CARE | Facility: CLINIC | Age: 65
End: 2025-01-07
Payer: COMMERCIAL

## 2025-01-07 LAB
ALBUMIN SERPL BCP-MCNC: 3.9 G/DL (ref 3.4–5)
ALP SERPL-CCNC: 60 U/L (ref 33–136)
ALT SERPL W P-5'-P-CCNC: 13 U/L (ref 7–45)
ANION GAP SERPL CALC-SCNC: 14 MMOL/L (ref 10–20)
AST SERPL W P-5'-P-CCNC: 19 U/L (ref 9–39)
BILIRUB SERPL-MCNC: 0.3 MG/DL (ref 0–1.2)
BUN SERPL-MCNC: 10 MG/DL (ref 6–23)
CALCIUM SERPL-MCNC: 9.8 MG/DL (ref 8.6–10.6)
CHLORIDE SERPL-SCNC: 104 MMOL/L (ref 98–107)
CHOLEST SERPL-MCNC: 231 MG/DL (ref 0–199)
CHOLESTEROL/HDL RATIO: 3.4
CO2 SERPL-SCNC: 28 MMOL/L (ref 21–32)
CREAT SERPL-MCNC: 0.82 MG/DL (ref 0.5–1.05)
EGFRCR SERPLBLD CKD-EPI 2021: 80 ML/MIN/1.73M*2
GLUCOSE SERPL-MCNC: 92 MG/DL (ref 74–99)
HDLC SERPL-MCNC: 68.4 MG/DL
LDLC SERPL CALC-MCNC: 149 MG/DL
NON HDL CHOLESTEROL: 163 MG/DL (ref 0–149)
POTASSIUM SERPL-SCNC: 4.2 MMOL/L (ref 3.5–5.3)
PROT SERPL-MCNC: 7.1 G/DL (ref 6.4–8.2)
SODIUM SERPL-SCNC: 142 MMOL/L (ref 136–145)
TRIGL SERPL-MCNC: 70 MG/DL (ref 0–149)
VLDL: 14 MG/DL (ref 0–40)

## 2025-01-07 NOTE — TELEPHONE ENCOUNTER
----- Message from Arvin CLEANING sent at 1/7/2025 11:45 AM EST -----      ----- Message -----  From: Donte Hannon MD  Sent: 1/7/2025   7:44 AM EST  To: Jacqueline Sánchez; Arvin Stone MA    Please call the patient regarding her abnormal result.  Cholesterol is elevated as we expected, need to work on low cholesterol low fat diet  Vit b12 a bit low which effects energy start taking 1000mcg b12 once a day over the counter

## 2025-02-20 ENCOUNTER — HOSPITAL ENCOUNTER (OUTPATIENT)
Dept: RADIOLOGY | Facility: HOSPITAL | Age: 65
Discharge: HOME | End: 2025-02-20
Payer: COMMERCIAL

## 2025-02-20 ENCOUNTER — HOSPITAL ENCOUNTER (OUTPATIENT)
Dept: RADIOLOGY | Facility: CLINIC | Age: 65
Discharge: HOME | End: 2025-02-20
Payer: COMMERCIAL

## 2025-02-20 VITALS — WEIGHT: 207.89 LBS | BODY MASS INDEX: 35.49 KG/M2 | HEIGHT: 64 IN

## 2025-02-20 DIAGNOSIS — Z12.31 SCREENING MAMMOGRAM, ENCOUNTER FOR: ICD-10-CM

## 2025-02-20 DIAGNOSIS — J44.9 CHRONIC OBSTRUCTIVE PULMONARY DISEASE, UNSPECIFIED COPD TYPE (MULTI): ICD-10-CM

## 2025-02-20 PROCEDURE — 71271 CT THORAX LUNG CANCER SCR C-: CPT | Performed by: RADIOLOGY

## 2025-02-20 PROCEDURE — 77067 SCR MAMMO BI INCL CAD: CPT

## 2025-02-20 PROCEDURE — 77063 BREAST TOMOSYNTHESIS BI: CPT | Performed by: RADIOLOGY

## 2025-02-20 PROCEDURE — 77067 SCR MAMMO BI INCL CAD: CPT | Performed by: RADIOLOGY

## 2025-02-20 PROCEDURE — 71271 CT THORAX LUNG CANCER SCR C-: CPT

## 2025-02-25 ENCOUNTER — TELEPHONE (OUTPATIENT)
Dept: PRIMARY CARE | Facility: CLINIC | Age: 65
End: 2025-02-25
Payer: COMMERCIAL

## 2025-02-25 NOTE — TELEPHONE ENCOUNTER
----- Message from Arvin CLEANING sent at 2/25/2025  8:43 AM EST -----    ----- Message -----  From: Donte Hannon MD  Sent: 2/24/2025   7:05 PM EST  To: Jacqueline Sánchez; Arvin Stone MA    Ct of lung shows no changes will repeat in 1 year

## 2025-03-25 ENCOUNTER — TELEPHONE (OUTPATIENT)
Dept: PRIMARY CARE | Facility: CLINIC | Age: 65
End: 2025-03-25
Payer: COMMERCIAL

## 2025-07-08 ENCOUNTER — APPOINTMENT (OUTPATIENT)
Dept: PRIMARY CARE | Facility: CLINIC | Age: 65
End: 2025-07-08
Payer: COMMERCIAL

## 2025-07-08 VITALS
WEIGHT: 213.2 LBS | BODY MASS INDEX: 36.4 KG/M2 | SYSTOLIC BLOOD PRESSURE: 131 MMHG | HEART RATE: 59 BPM | DIASTOLIC BLOOD PRESSURE: 78 MMHG | HEIGHT: 64 IN

## 2025-07-08 DIAGNOSIS — I21.4 NSTEMI (NON-ST ELEVATED MYOCARDIAL INFARCTION) (MULTI): ICD-10-CM

## 2025-07-08 DIAGNOSIS — T78.40XA ALLERGY, INITIAL ENCOUNTER: ICD-10-CM

## 2025-07-08 DIAGNOSIS — I10 BENIGN ESSENTIAL HYPERTENSION: Primary | ICD-10-CM

## 2025-07-08 DIAGNOSIS — E03.9 ACQUIRED HYPOTHYROIDISM: ICD-10-CM

## 2025-07-08 DIAGNOSIS — I82.5Y9 CHRONIC DEEP VEIN THROMBOSIS (DVT) OF PROXIMAL VEIN OF LOWER EXTREMITY, UNSPECIFIED LATERALITY: ICD-10-CM

## 2025-07-08 DIAGNOSIS — E66.01 CLASS 2 SEVERE OBESITY DUE TO EXCESS CALORIES WITH SERIOUS COMORBIDITY AND BODY MASS INDEX (BMI) OF 35.0 TO 35.9 IN ADULT: ICD-10-CM

## 2025-07-08 DIAGNOSIS — F17.210 CIGARETTE NICOTINE DEPENDENCE WITHOUT COMPLICATION: ICD-10-CM

## 2025-07-08 DIAGNOSIS — M12.9 ARTHROPATHY: ICD-10-CM

## 2025-07-08 DIAGNOSIS — J44.9 CHRONIC OBSTRUCTIVE PULMONARY DISEASE, UNSPECIFIED COPD TYPE (MULTI): ICD-10-CM

## 2025-07-08 DIAGNOSIS — E78.5 DYSLIPIDEMIA: ICD-10-CM

## 2025-07-08 DIAGNOSIS — E66.812 CLASS 2 SEVERE OBESITY DUE TO EXCESS CALORIES WITH SERIOUS COMORBIDITY AND BODY MASS INDEX (BMI) OF 35.0 TO 35.9 IN ADULT: ICD-10-CM

## 2025-07-08 PROCEDURE — 99214 OFFICE O/P EST MOD 30 MIN: CPT | Performed by: FAMILY MEDICINE

## 2025-07-08 PROCEDURE — 3008F BODY MASS INDEX DOCD: CPT | Performed by: FAMILY MEDICINE

## 2025-07-08 PROCEDURE — 3075F SYST BP GE 130 - 139MM HG: CPT | Performed by: FAMILY MEDICINE

## 2025-07-08 PROCEDURE — 3078F DIAST BP <80 MM HG: CPT | Performed by: FAMILY MEDICINE

## 2025-07-08 RX ORDER — EZETIMIBE 10 MG/1
10 TABLET ORAL DAILY
Qty: 90 TABLET | Refills: 1 | Status: SHIPPED | OUTPATIENT
Start: 2025-07-08 | End: 2026-01-04

## 2025-07-08 RX ORDER — FLUTICASONE PROPIONATE 50 MCG
2 SPRAY, SUSPENSION (ML) NASAL DAILY
Qty: 16 G | Refills: 3 | Status: SHIPPED | OUTPATIENT
Start: 2025-07-08 | End: 2025-08-07

## 2025-07-08 ASSESSMENT — ENCOUNTER SYMPTOMS
CARDIOVASCULAR NEGATIVE: 1
GASTROINTESTINAL NEGATIVE: 1
CONSTITUTIONAL NEGATIVE: 1
MUSCULOSKELETAL NEGATIVE: 1
RESPIRATORY NEGATIVE: 1
OCCASIONAL FEELINGS OF UNSTEADINESS: 0
LOSS OF SENSATION IN FEET: 0
DEPRESSION: 0
NEUROLOGICAL NEGATIVE: 1

## 2025-07-08 ASSESSMENT — PATIENT HEALTH QUESTIONNAIRE - PHQ9
1. LITTLE INTEREST OR PLEASURE IN DOING THINGS: NOT AT ALL
SUM OF ALL RESPONSES TO PHQ9 QUESTIONS 1 AND 2: 0
2. FEELING DOWN, DEPRESSED OR HOPELESS: NOT AT ALL

## 2025-07-08 NOTE — PROGRESS NOTES
"Subjective   Patient ID: Celeste Dave is a 64 y.o. female who presents for No chief complaint on file..    HPI chronic dvt, hypothyroid, htn, chol, smoker, copd    Review of Systems   Constitutional: Negative.    HENT: Negative.     Respiratory: Negative.     Cardiovascular: Negative.    Gastrointestinal: Negative.    Musculoskeletal: Negative.    Neurological: Negative.    Pt unable tto tolerate repatha or statin is now on vytorin and need to fu w cardio    Objective   /78   Pulse 59   Ht 1.626 m (5' 4\")   Wt 96.7 kg (213 lb 3.2 oz)   BMI 36.60 kg/m²     Physical Exam  Vitals reviewed.   Constitutional:       Appearance: Normal appearance. She is normal weight.   Eyes:      Extraocular Movements: Extraocular movements intact.      Conjunctiva/sclera: Conjunctivae normal.      Pupils: Pupils are equal, round, and reactive to light.   Cardiovascular:      Rate and Rhythm: Normal rate and regular rhythm.      Pulses: Normal pulses.      Heart sounds: Normal heart sounds.   Pulmonary:      Effort: Pulmonary effort is normal.      Breath sounds: Normal breath sounds.   Abdominal:      General: Bowel sounds are normal.      Palpations: Abdomen is soft.   Musculoskeletal:         General: Normal range of motion.   Skin:     General: Skin is warm and dry.   Neurological:      General: No focal deficit present.      Mental Status: She is alert and oriented to person, place, and time. Mental status is at baseline.         Assessment/Plan   Problem List Items Addressed This Visit           ICD-10-CM    Arthropathy M12.9    Benign essential hypertension - Primary I10    Relevant Orders    Comprehensive Metabolic Panel    Chronic deep vein thrombosis (DVT) of proximal vein of lower extremity I82.5Y9    Class 2 obesity due to excess calories in adult E66.812, E66.09    COPD (chronic obstructive pulmonary disease) (Multi) J44.9    Dyslipidemia E78.5    Relevant Medications    ezetimibe (Zetia) 10 mg tablet    Other " Relevant Orders    Lipid Panel    Hypothyroidism E03.9    Relevant Orders    TSH with reflex to Free T4 if abnormal    Nicotine dependence F17.200    NSTEMI (non-ST elevated myocardial infarction) (Multi) I21.4    Relevant Medications    ezetimibe (Zetia) 10 mg tablet     Other Visit Diagnoses         Codes      Allergy, initial encounter     T78.40XA    Relevant Medications    fluticasone (Flonase) 50 mcg/actuation nasal spray          Pt has not seein cardio in over 1 year will sched appt , let them know whe is still smokking and only able to tolerate zetia pt decliness any other cholesterol medication   Htn stable ccc  Obesity work on low carb diet and fu  Still smoking pt not intrested in quiting at this time

## 2025-07-09 ENCOUNTER — OFFICE VISIT (OUTPATIENT)
Dept: CARDIOLOGY | Facility: CLINIC | Age: 65
End: 2025-07-09
Payer: COMMERCIAL

## 2025-07-09 VITALS
OXYGEN SATURATION: 95 % | DIASTOLIC BLOOD PRESSURE: 62 MMHG | WEIGHT: 213 LBS | SYSTOLIC BLOOD PRESSURE: 131 MMHG | BODY MASS INDEX: 36.37 KG/M2 | HEIGHT: 64 IN | HEART RATE: 61 BPM

## 2025-07-09 DIAGNOSIS — E78.5 DYSLIPIDEMIA: ICD-10-CM

## 2025-07-09 DIAGNOSIS — I25.10 CORONARY ARTERY CALCIFICATION: ICD-10-CM

## 2025-07-09 DIAGNOSIS — R06.09 DOE (DYSPNEA ON EXERTION): ICD-10-CM

## 2025-07-09 DIAGNOSIS — I50.33 ACUTE ON CHRONIC DIASTOLIC HEART FAILURE: ICD-10-CM

## 2025-07-09 DIAGNOSIS — R07.89 ATYPICAL CHEST PAIN: Primary | ICD-10-CM

## 2025-07-09 DIAGNOSIS — I42.8 OTHER CARDIOMYOPATHY: ICD-10-CM

## 2025-07-09 DIAGNOSIS — R94.39 ABNORMAL STRESS TEST: ICD-10-CM

## 2025-07-09 DIAGNOSIS — G45.9 TIA (TRANSIENT ISCHEMIC ATTACK): ICD-10-CM

## 2025-07-09 DIAGNOSIS — I25.10 CORONARY ARTERY DISEASE INVOLVING NATIVE CORONARY ARTERY OF NATIVE HEART WITHOUT ANGINA PECTORIS: ICD-10-CM

## 2025-07-09 PROCEDURE — 3078F DIAST BP <80 MM HG: CPT | Performed by: STUDENT IN AN ORGANIZED HEALTH CARE EDUCATION/TRAINING PROGRAM

## 2025-07-09 PROCEDURE — 99212 OFFICE O/P EST SF 10 MIN: CPT

## 2025-07-09 PROCEDURE — 3008F BODY MASS INDEX DOCD: CPT | Performed by: STUDENT IN AN ORGANIZED HEALTH CARE EDUCATION/TRAINING PROGRAM

## 2025-07-09 PROCEDURE — 3075F SYST BP GE 130 - 139MM HG: CPT | Performed by: STUDENT IN AN ORGANIZED HEALTH CARE EDUCATION/TRAINING PROGRAM

## 2025-07-09 PROCEDURE — 99214 OFFICE O/P EST MOD 30 MIN: CPT | Performed by: STUDENT IN AN ORGANIZED HEALTH CARE EDUCATION/TRAINING PROGRAM

## 2025-07-09 NOTE — PROGRESS NOTES
Follow-up     HPI:    Celeste Dave is a 64 y.o. female with pertinent history of cervical cancer, TIA, active tobacco abuse, hypertension, hyperlipidemia with intolerance to numerous statins and Repatha, acute pulmonary embolism status post IVC filter in 2021 on Eliquis, dyslipidemia, intolerance to multiple statins with severe myalgia, family history of premature coronary artery disease, history of moderate cardiomyopathy with ejection fraction 35 to 40% 6/30/2018, recovery of ejection fraction to 55% on echo performed 7/2/2018, severely elevated coronary calcium score 574 predominantly involving the LAD and RCA consistent with the degree of coronary artery disease on CT calcium score performed 5/12/2023, concern for inferior wall ischemia on pharmacologic nuclear stress test, mild to moderate nonobstructive coronary artery disease with elevated LVEDP of 14 to 15 mmHg consistent with degree of acute on chronic diastolic heart failure and catheterization performed 3/11/2024, preserved ejection fraction 55 to 60% with impaired relaxation echo performed 2/29/2024 presents to cardiology clinic for follow-up.    She is relatively stable.  Dyspnea exertion is stable.  She continues to work on quitting smoking.  Unfortunately she developed severe muscle achiness on Repatha needs to discontinue it.  Of note she has failed numerous statins in the past would not like to reattempt.  We discussed her prior echo and catheterization results.  No exacerbating or relieving factors.  Pt denies orthopnea, and paroxysmal nocturnal dyspnea.  Pt denies worsening lower extremity edema.  Pt denies palpitations or syncope.  No recent falls.  No fever or chills.  No cough.  No change in bowel or bladder habits.  No sick contacts.  No recent travel.    12 point review of systems including (Constitutional, Eyes, ENMT, Respiratory, Cardiac, Gastrointestinal, Neurological, Psychiatric, and Hematologic) was performed and is otherwise  negative.    Past medical history reviewed:   has a past medical history of Candidiasis, unspecified (2015), Other abnormal and inconclusive findings on diagnostic imaging of breast, Personal history of other diseases of the nervous system and sense organs (2015), Personal history of pulmonary embolism, and Stroke (Multi).    She has no past medical history of Personal history of irradiation.    Past surgical history reviewed:   has a past surgical history that includes Cholecystectomy (2018); Total abdominal hysterectomy w/ bilateral salpingoophorectomy (2018); Other surgical history (2018);  section, classic (2018); Exploratory laparotomy (2018); Breast biopsy (Left, 2018); CT angio coronary art with heartflow if score >30% (2018); and Cardiac catheterization (N/A, 3/11/2024).    Social history reviewed:   reports that she has been smoking cigarettes. She has never used smokeless tobacco. No history on file for alcohol use and drug use.     Family history reviewed: maternal uncle with MI at 50.  Maternal grandmother CAD    Allergies reviewed: Atorvastatin, Moxifloxacin, and Pravastatin     Medications reviewed:   Current Outpatient Medications   Medication Instructions    albuterol (Proventil HFA) 90 mcg/actuation inhaler 2 puffs, inhalation, Every 4 hours PRN    apixaban (ELIQUIS) 5 mg, oral, 2 times daily    dilTIAZem CD (CARDIZEM CD) 180 mg, oral, Daily    evolocumab with infusor (REPATHA PUSHTRONEX) 420 mg, subcutaneous, Every 28 days    ezetimibe (ZETIA) 10 mg, oral, Daily    fluticasone (Flonase) 50 mcg/actuation nasal spray 2 sprays, Each Nostril, Daily, Shake gently. Before first use, prime pump. After use, clean tip and replace cap.    Synthroid 112 mcg, oral, Daily    traZODone (DESYREL) 150 mg, oral, Nightly PRN        Vitals reviewed: Visit Vitals  /62   Pulse 61       Physical Exam:   General:  Patient is awake, alert, and oriented.   Patient is in no acute distress.  HEENT:  Pupils equal and reactive.  Normocephalic.  Moist mucosa.    Neck:  No thyromegaly.  Normal Jugular Venous Pressure.  Cardiovascular:  Regular rate and rhythm.  Normal S1 and S2.  1/6 REDD.  Pulmonary:  Clear to auscultation bilaterally.  Abdomen:  Soft. Non-tender.   Non-distended.  Positive bowel sounds.  Lower Extremities:  2+ pedal pulses. No LE edema.  Neurologic:  Cranial nerves intact.  No focal deficit.   Skin: Skin warm and dry, normal skin turgor.   Psychiatric: Normal affect.    Last Labs:  CBC -      Lab Results   Component Value Date    WBC 7.1 01/06/2025    HGB 14.1 01/06/2025    HCT 44.6 01/06/2025     01/06/2025        CMP-  Lab Results   Component Value Date    GLUCOSE 92 01/06/2025     01/06/2025    K 4.2 01/06/2025     01/06/2025    CO2 28 01/06/2025    ANIONGAP 14 01/06/2025    BUN 10 01/06/2025    CREATININE 0.82 01/06/2025    EGFR 80 01/06/2025    CALCIUM 9.8 01/06/2025    PROT 7.1 01/06/2025    ALBUMIN 3.9 01/06/2025    AST 19 01/06/2025    ALT 13 01/06/2025    ALKPHOS 60 01/06/2025    BILITOT 0.3 01/06/2025        LIPIDS-  Lab Results   Component Value Date    CHOL 231 (H) 01/06/2025    TRIG 70 01/06/2025    HDL 68.4 01/06/2025    CHHDL 3.4 01/06/2025    VLDL 14 01/06/2025        OTHERS-  Lab Results   Component Value Date    HGBA1C 5.3 06/29/2018     (H) 06/30/2018        I personally reviewed the patient's recent vitals, labs, medications, orders, EKGs, pertinent cardiac imaging/ echocardiography and ischemic evaluations including stress testing/ cardiac catheterization.    Assessment and Plan:    Celeste Dave is a 64 y.o. female with pertinent history of cervical cancer, TIA, active tobacco abuse, hypertension, hyperlipidemia with intolerance to numerous statins and Repatha, acute pulmonary embolism status post IVC filter in 2021 on Eliquis, dyslipidemia, intolerance to multiple statins with severe myalgia, family history  of premature coronary artery disease, history of moderate cardiomyopathy with ejection fraction 35 to 40% 6/30/2018, recovery of ejection fraction to 55% on echo performed 7/2/2018, severely elevated coronary calcium score 574 predominantly involving the LAD and RCA consistent with the degree of coronary artery disease on CT calcium score performed 5/12/2023, concern for inferior wall ischemia on pharmacologic nuclear stress test, mild to moderate nonobstructive coronary artery disease with elevated LVEDP of 14 to 15 mmHg consistent with degree of acute on chronic diastolic heart failure and catheterization performed 3/11/2024, preserved ejection fraction 55 to 60% with impaired relaxation echo performed 2/29/2024 presents to cardiology clinic for follow-up.  She is relatively stable.  Dyspnea exertion is stable.  She continues to work on quitting smoking.  Unfortunately she developed severe muscle achiness on Repatha needs to discontinue it.  Of note she has failed numerous statins in the past would not like to reattempt.  We discussed her prior echo and catheterization results.     Please continue current cardiac medications including Eliquis 5 mg twice daily, diltiazem extended release 180 mg daily, ezetimibe 10 mg daily.    We will obtain a transthoracic echocardiogram for structural evaluation including ejection fraction, assessment of regional wall motion abnormalities or valvular disease, and further evaluation of hemodynamics prior to follow-up.    Please followup with me in Cardiology clinic within the next 8 months.  Please return to clinic sooner or seek emergent care if your symptoms reoccur or worsen.    Thank you for allowing me to participate in their care.  Please feel free to call me with any further questions or concerns.        Jt Torres MD, FACC, [facility]  Division of Cardiovascular Medicine  Medical Director, [facility]  [facility] [place]  Clinical , [facility] [employer]  [email or web address]   Office:  [phone]

## 2025-07-09 NOTE — PATIENT INSTRUCTIONS
Please continue current cardiac medications including Eliquis 5 mg twice daily, diltiazem extended release 180 mg daily, ezetimibe 10 mg daily.    We will obtain a transthoracic echocardiogram for structural evaluation including ejection fraction, assessment of regional wall motion abnormalities or valvular disease, and further evaluation of hemodynamics prior to follow-up.    Please followup with me in Cardiology clinic within the next 8 months.  Please return to clinic sooner or seek emergent care if your symptoms reoccur or worsen.

## 2025-07-10 LAB
ALBUMIN SERPL-MCNC: 4.2 G/DL (ref 3.6–5.1)
ALP SERPL-CCNC: 73 U/L (ref 37–153)
ALT SERPL-CCNC: 11 U/L (ref 6–29)
ANION GAP SERPL CALCULATED.4IONS-SCNC: 9 MMOL/L (CALC) (ref 7–17)
AST SERPL-CCNC: 22 U/L (ref 10–35)
BILIRUB SERPL-MCNC: 0.3 MG/DL (ref 0.2–1.2)
BUN SERPL-MCNC: 11 MG/DL (ref 7–25)
CALCIUM SERPL-MCNC: 9.5 MG/DL (ref 8.6–10.4)
CHLORIDE SERPL-SCNC: 103 MMOL/L (ref 98–110)
CHOLEST SERPL-MCNC: 280 MG/DL
CHOLEST/HDLC SERPL: 3.5 (CALC)
CO2 SERPL-SCNC: 26 MMOL/L (ref 20–32)
CREAT SERPL-MCNC: 0.77 MG/DL (ref 0.5–1.05)
EGFRCR SERPLBLD CKD-EPI 2021: 87 ML/MIN/1.73M2
GLUCOSE SERPL-MCNC: 86 MG/DL (ref 65–99)
HDLC SERPL-MCNC: 79 MG/DL
LDLC SERPL CALC-MCNC: 181 MG/DL (CALC)
NONHDLC SERPL-MCNC: 201 MG/DL (CALC)
POTASSIUM SERPL-SCNC: 5.5 MMOL/L (ref 3.5–5.3)
PROT SERPL-MCNC: 7.6 G/DL (ref 6.1–8.1)
SODIUM SERPL-SCNC: 138 MMOL/L (ref 135–146)
TRIGL SERPL-MCNC: 90 MG/DL
TSH SERPL-ACNC: 3.28 MIU/L (ref 0.4–4.5)

## 2025-07-14 DIAGNOSIS — F51.01 PRIMARY INSOMNIA: ICD-10-CM

## 2025-07-14 DIAGNOSIS — I48.11 LONGSTANDING PERSISTENT ATRIAL FIBRILLATION (MULTI): ICD-10-CM

## 2025-07-14 DIAGNOSIS — E05.00 GRAVES DISEASE: ICD-10-CM

## 2025-07-14 RX ORDER — LEVOTHYROXINE SODIUM 112 UG/1
112 TABLET ORAL DAILY
Qty: 90 TABLET | Refills: 0 | Status: SHIPPED | OUTPATIENT
Start: 2025-07-14

## 2025-07-14 RX ORDER — TRAZODONE HYDROCHLORIDE 150 MG/1
150 TABLET ORAL NIGHTLY PRN
Qty: 90 TABLET | Refills: 1 | Status: SHIPPED | OUTPATIENT
Start: 2025-07-14 | End: 2026-01-10

## 2025-07-14 NOTE — TELEPHONE ENCOUNTER
Patient calls stating she only has two days left of medications, asking if you can send refills for patient while Dr. Hannon is out of the office.

## 2025-08-30 DIAGNOSIS — I15.9 SECONDARY HYPERTENSION: ICD-10-CM

## 2025-09-02 RX ORDER — DILTIAZEM HYDROCHLORIDE 180 MG/1
180 CAPSULE, COATED, EXTENDED RELEASE ORAL DAILY
Qty: 90 CAPSULE | Refills: 0 | Status: SHIPPED | OUTPATIENT
Start: 2025-09-02

## 2026-04-09 ENCOUNTER — APPOINTMENT (OUTPATIENT)
Dept: CARDIOLOGY | Facility: CLINIC | Age: 66
End: 2026-04-09
Payer: COMMERCIAL

## (undated) DEVICE — INTRODUCER, GLIDESHEATH SLENDER A-KIT, 5FR 10CM

## (undated) DEVICE — GUIDEWIRE, INQWIRE, 3MM J, .035, 150

## (undated) DEVICE — TR BAND, RADIAL COMPRESSION, STANDARD, 24CM

## (undated) DEVICE — CATHETER, OPTITORQUE, 5FR, TIG, 1H/100CM

## (undated) DEVICE — GUIDEWIRE, MANDRIL, COPE, W/NITINOL, 0.018 IN X 60 CM